# Patient Record
Sex: FEMALE
[De-identification: names, ages, dates, MRNs, and addresses within clinical notes are randomized per-mention and may not be internally consistent; named-entity substitution may affect disease eponyms.]

---

## 2017-07-28 ENCOUNTER — HOSPITAL ENCOUNTER (OUTPATIENT)
Dept: HOSPITAL 42 - SDSVAS | Age: 82
LOS: 1 days | Discharge: HOME | End: 2017-07-29
Attending: INTERNAL MEDICINE
Payer: MEDICARE

## 2017-07-28 VITALS — BODY MASS INDEX: 20.2 KG/M2

## 2017-07-28 DIAGNOSIS — I10: ICD-10-CM

## 2017-07-28 DIAGNOSIS — M19.90: ICD-10-CM

## 2017-07-28 DIAGNOSIS — I50.9: ICD-10-CM

## 2017-07-28 DIAGNOSIS — I25.118: ICD-10-CM

## 2017-07-28 DIAGNOSIS — E78.5: ICD-10-CM

## 2017-07-28 DIAGNOSIS — I73.9: Primary | ICD-10-CM

## 2017-07-28 DIAGNOSIS — M81.0: ICD-10-CM

## 2017-07-28 DIAGNOSIS — J45.909: ICD-10-CM

## 2017-07-28 DIAGNOSIS — Z85.850: ICD-10-CM

## 2017-07-28 DIAGNOSIS — Z85.038: ICD-10-CM

## 2017-07-28 LAB
ADD MANUAL DIFF?: NO
APTT BLD: 30.1 SECONDS (ref 23.7–30.8)
BASOPHILS # BLD AUTO: 0.06 K/MM3 (ref 0–2)
BASOPHILS NFR BLD: 0.9 % (ref 0–3)
BUN SERPL-MCNC: 13 MG/DL (ref 7–21)
CALCIUM SERPL-MCNC: 9.4 MG/DL (ref 8.4–10.5)
CHLORIDE SERPL-SCNC: 103 MMOL/L (ref 95–110)
CHOLEST SERPL-MCNC: 161 MG/DL (ref 130–200)
CO2 SERPL-SCNC: 24 MMOL/L (ref 21–33)
EOSINOPHIL # BLD: 0.9 10*3/UL (ref 0–0.7)
EOSINOPHIL NFR BLD: 14.3 % (ref 1.5–5)
ERYTHROCYTE [DISTWIDTH] IN BLOOD BY AUTOMATED COUNT: 13.9 % (ref 11.5–14.5)
GLUCOSE SERPL-MCNC: 86 MG/DL (ref 70–110)
GRANULOCYTES # BLD: 3.31 10*3/UL (ref 1.4–6.5)
GRANULOCYTES NFR BLD: 50.4 % (ref 50–68)
HCT VFR BLD CALC: 41.2 % (ref 36–48)
INR PPP: 1.08 (ref 0.93–1.08)
LYMPHOCYTES # BLD: 1.8 10*3/UL (ref 1.2–3.4)
LYMPHOCYTES NFR BLD AUTO: 26.6 % (ref 22–35)
MCH RBC QN AUTO: 33.4 PG (ref 25–35)
MCHC RBC AUTO-ENTMCNC: 33.7 G/DL (ref 31–37)
MCV RBC AUTO: 99 FL (ref 80–105)
MONOCYTES # BLD AUTO: 0.5 10*3/UL (ref 0.1–0.6)
MONOCYTES NFR BLD: 7.8 % (ref 1–6)
PLATELET # BLD: 290 10^3/UL (ref 120–450)
PMV BLD AUTO: 11.1 FL (ref 7–11)
POTASSIUM SERPL-SCNC: 4.2 MMOL/L (ref 3.6–5)
SODIUM SERPL-SCNC: 139 MMOL/L (ref 132–148)
WBC # BLD AUTO: 6.6 10^3/UL (ref 4.5–11)

## 2017-07-28 PROCEDURE — 85730 THROMBOPLASTIN TIME PARTIAL: CPT

## 2017-07-28 PROCEDURE — 85025 COMPLETE CBC W/AUTO DIFF WBC: CPT

## 2017-07-28 PROCEDURE — 86900 BLOOD TYPING SEROLOGIC ABO: CPT

## 2017-07-28 PROCEDURE — 36200 PLACE CATHETER IN AORTA: CPT

## 2017-07-28 PROCEDURE — 86850 RBC ANTIBODY SCREEN: CPT

## 2017-07-28 PROCEDURE — 75625 CONTRAST EXAM ABDOMINL AORTA: CPT

## 2017-07-28 PROCEDURE — 85610 PROTHROMBIN TIME: CPT

## 2017-07-28 PROCEDURE — 36221 PLACE CATH THORACIC AORTA: CPT

## 2017-07-28 PROCEDURE — 75716 ARTERY X-RAYS ARMS/LEGS: CPT

## 2017-07-28 PROCEDURE — 80061 LIPID PANEL: CPT

## 2017-07-28 PROCEDURE — 36415 COLL VENOUS BLD VENIPUNCTURE: CPT

## 2017-07-28 PROCEDURE — 99153 MOD SED SAME PHYS/QHP EA: CPT

## 2017-07-28 PROCEDURE — 99152 MOD SED SAME PHYS/QHP 5/>YRS: CPT

## 2017-07-28 PROCEDURE — 80048 BASIC METABOLIC PNL TOTAL CA: CPT

## 2017-07-28 NOTE — CP.PCM.PN
Subjective





- Date & Time of Evaluation


Date of Evaluation: 07/28/17


Time of Evaluation: 18:36





- Subjective


Subjective: 





s/p peripheral angiogram 





Objective





- Vital Signs/Intake and Output


Vital Signs (last 24 hours): 


 











Temp Pulse Resp BP Pulse Ox


 


 98.1 F   76   18   125/58 L  90 L


 


 07/28/17 18:08  07/28/17 18:08  07/28/17 18:08  07/28/17 18:08  07/28/17 13:25











- Medications


Medications: 


 Current Medications





Atorvastatin Calcium (Lipitor)  10 mg PO DIN NORM


Carvedilol (Coreg)  3.125 mg PO BID NORM


Duloxetine HCl (Cymbalta)  60 mg PO DAILY NORM


Furosemide (Lasix)  20 mg PO DAILY NORM


Gabapentin (Neurontin)  100 mg PO DAILY NORM


   PRN Reason: Protocol


Sodium Chloride (Sodium Chloride 0.9%)  1,000 mls @ 100 mls/hr IV .Q10H NORM


Levothyroxine Sodium (Synthroid)  75 mcg PO DAILY NORM


Losartan Potassium (Cozaar)  25 mg PO BID NORM


Non-Formulary Medication (Ranolazine [Ranexa])  500 mg PO BID NORM


Pantoprazole Sodium (Protonix Ec Tab)  40 mg PO ACB NORM


Spironolactone (Aldactone)  25 mg PO BID NORM


Zolpidem Tartrate (Ambien)  5 mg PO HS NORM


   PRN Reason: Protocol











- Labs


Labs: 


 





 07/28/17 13:35 





 07/28/17 13:35 





 











PT  11.7 Seconds (9.9-11.8)   07/28/17  13:35    


 


INR  1.08  (0.93-1.08)   07/28/17  13:35    


 


APTT  30.1 Seconds (23.7-30.8)   07/28/17  13:35    














Assessment and Plan


(1) History of femoral angiogram


Assessment & Plan: 


s/p peripheral angiogram





Diffuse bilateral PVOD 


Rx 


medical therapy 


no intervention for now 





Status: Acute

## 2017-07-29 VITALS — OXYGEN SATURATION: 99 % | TEMPERATURE: 98.1 F | RESPIRATION RATE: 18 BRPM

## 2017-07-29 VITALS — HEART RATE: 64 BPM | DIASTOLIC BLOOD PRESSURE: 65 MMHG | SYSTOLIC BLOOD PRESSURE: 125 MMHG

## 2017-10-09 ENCOUNTER — HOSPITAL ENCOUNTER (OUTPATIENT)
Dept: HOSPITAL 42 - CATH | Age: 82
LOS: 1 days | Discharge: HOME | End: 2017-10-10
Attending: INTERNAL MEDICINE
Payer: MEDICARE

## 2017-10-09 VITALS — BODY MASS INDEX: 20.2 KG/M2

## 2017-10-09 DIAGNOSIS — I10: ICD-10-CM

## 2017-10-09 DIAGNOSIS — I25.2: ICD-10-CM

## 2017-10-09 DIAGNOSIS — E78.5: ICD-10-CM

## 2017-10-09 DIAGNOSIS — Z95.5: ICD-10-CM

## 2017-10-09 DIAGNOSIS — I25.119: Primary | ICD-10-CM

## 2017-10-09 LAB
APTT BLD: 29.7 SECONDS (ref 23.7–30.8)
BASOPHILS # BLD AUTO: 0.05 K/MM3 (ref 0–2)
BASOPHILS NFR BLD: 0.6 % (ref 0–3)
BUN SERPL-MCNC: 16 MG/DL (ref 7–21)
CALCIUM SERPL-MCNC: 9.7 MG/DL (ref 8.4–10.5)
CHLORIDE SERPL-SCNC: 103 MMOL/L (ref 98–107)
CHOLEST SERPL-MCNC: 183 MG/DL (ref 130–200)
CO2 SERPL-SCNC: 27 MMOL/L (ref 21–33)
EOSINOPHIL # BLD: 0.8 10*3/UL (ref 0–0.7)
EOSINOPHIL NFR BLD: 10 % (ref 1.5–5)
ERYTHROCYTE [DISTWIDTH] IN BLOOD BY AUTOMATED COUNT: 13 % (ref 11.5–14.5)
GLUCOSE SERPL-MCNC: 99 MG/DL (ref 70–110)
GRANULOCYTES # BLD: 4.53 10*3/UL (ref 1.4–6.5)
GRANULOCYTES NFR BLD: 58.7 % (ref 50–68)
HCT VFR BLD CALC: 44.3 % (ref 36–48)
INR PPP: 1.06 (ref 0.93–1.08)
LYMPHOCYTES # BLD: 1.7 10*3/UL (ref 1.2–3.4)
LYMPHOCYTES NFR BLD AUTO: 21.8 % (ref 22–35)
MCH RBC QN AUTO: 33.2 PG (ref 25–35)
MCHC RBC AUTO-ENTMCNC: 33.4 G/DL (ref 31–37)
MCV RBC AUTO: 99.3 FL (ref 80–105)
MONOCYTES # BLD AUTO: 0.7 10*3/UL (ref 0.1–0.6)
MONOCYTES NFR BLD: 8.9 % (ref 1–6)
PLATELET # BLD: 287 10^3/UL (ref 120–450)
PMV BLD AUTO: 11 FL (ref 7–11)
POTASSIUM SERPL-SCNC: 4.4 MMOL/L (ref 3.6–5)
SODIUM SERPL-SCNC: 142 MMOL/L (ref 132–148)
WBC # BLD AUTO: 7.7 10^3/UL (ref 4.5–11)

## 2017-10-09 PROCEDURE — 85730 THROMBOPLASTIN TIME PARTIAL: CPT

## 2017-10-09 PROCEDURE — 85610 PROTHROMBIN TIME: CPT

## 2017-10-09 PROCEDURE — 92920 PRQ TRLUML C ANGIOP 1ART&/BR: CPT

## 2017-10-09 PROCEDURE — 86900 BLOOD TYPING SEROLOGIC ABO: CPT

## 2017-10-09 PROCEDURE — 99153 MOD SED SAME PHYS/QHP EA: CPT

## 2017-10-09 PROCEDURE — 93458 L HRT ARTERY/VENTRICLE ANGIO: CPT

## 2017-10-09 PROCEDURE — 99152 MOD SED SAME PHYS/QHP 5/>YRS: CPT

## 2017-10-09 PROCEDURE — 85025 COMPLETE CBC W/AUTO DIFF WBC: CPT

## 2017-10-09 PROCEDURE — 80048 BASIC METABOLIC PNL TOTAL CA: CPT

## 2017-10-09 PROCEDURE — 80061 LIPID PANEL: CPT

## 2017-10-09 PROCEDURE — 86850 RBC ANTIBODY SCREEN: CPT

## 2017-10-09 PROCEDURE — 36415 COLL VENOUS BLD VENIPUNCTURE: CPT

## 2017-10-09 RX ADMIN — MORPHINE SULFATE PRN MG: 2 INJECTION, SOLUTION INTRAMUSCULAR; INTRAVENOUS at 19:42

## 2017-10-09 NOTE — CARDCATH
PROCEDURE DATE:  10/09/2017



INDICATIONS:  The patient is an 85-year-old  female with past

medical history significant for CAD, hypertension, dyslipidemia, status

post PTCA and stenting of left anterior descending artery and laser

atherectomy of left circumflex artery that was done 4 years ago with assist

of Impella, at which time she had presented with a cardiogenic shock and

acute MI.  The patient recently was evaluated with a nuclear stress test

prior to a surgical procedure for preoperative cardiovascular

stratification as she was having symptoms of angina and chest pain

accompanied by shortness of breath for which she was brought to the cardiac

cath lab for further evaluation and treatment.



PROCEDURE PERFORMED:  Left heart catheterization with selective left and

right coronary angiogram, 6-South African left femoral arterial access, PTCA of

left circumflex coronary artery with use of 1.5 and 2.0 balloon, manual

pressure for hemostasis.



TECHNIQUES OF PROCEDURE:  After obtaining informed consent, the patient was

brought to the cardiac cath suite in post-absorptive non-sedated state. 

The patient was prepped and draped in the usual sterile fashion.  A 2%

lidocaine was used for infiltration anesthesia.  Using modified Seldinger

technique, a 6-South African sheath was introduced into the left femoral artery. 

Subsequently, a JL5 and a JR4 diagnostic catheter was used to engage the

left and right coronary systems and angiograms were obtained in 2

orthogonal views.  Subsequently, pigtail catheter was advanced across the

aortic valve and LV gram was obtained in the GIBBS view.  Hemodynamics were

obtained.



HEMODYNAMIC FINDINGS:  Left ventricular end-diastolic pressure was 19 mmHg.

There was no gradient noted upon the aortic valve pullback.  There was no

AI, no MR.  Left ventricular ejection fraction estimated to be 40% to 45%

with mild global hypokinesis.



CORONARY ANATOMY:  Left main is the large-sized vessel bifurcates into left

anterior descending and left circumflex coronary artery.  Left anterior

descending severely calcified, known obstructive with a patent prox to LAD

stent, gives off a medium-sized diagonal branch.  Left circumflex coronary

artery severely calcified with 80% proximal, 70% mid and 80% distal

stenosis.  Right coronary artery mid 100%  with left to right

co-dominant circulation.



TECHNIQUES AND INTERVENTION:  After reviewing the above angiographic

findings, it was decided to further intervene on the severely calcified

left circumflex stenosis over XB 4.0 guiding catheter.  A Prowater wire was

negotiated through the lesions into the distal left circumflex coronary

artery.  Initially, attempt was made to pass the 2.0 balloon which was

unable to pass.  At this point, a 1.5 balloon was inflated to pre-dilate

the calcified lesions.  Subsequently, a 2-0 balloon was used.  The balloon

was unable to cross into the mid lesion.  At this time, it was decided to

abort the procedure for possible atherectomy.



IMPRESSION:  Attempted revascularization of severely calcified left

circumflex coronary artery with use of 1.5 and 2.0 balloon.



RECOMMENDATIONS:  The patient is to be brought back for stage intervention

with atherectomy with a Rotablator which will be arranged at [facility] in 2 to 4 weeks' time.  Keep the patient on guideline-directed

therapy, dual-antiplatelet therapy, beta-blockers, ACE inhibitors, statin,

and nitrates.







__________________________________________

Ciaran Rene MD





DD:  10/09/2017 13:11:57

DT:  10/09/2017 14:51:12

Job # [id number]

## 2017-10-10 VITALS
TEMPERATURE: 98.5 F | HEART RATE: 66 BPM | RESPIRATION RATE: 18 BRPM | SYSTOLIC BLOOD PRESSURE: 141 MMHG | DIASTOLIC BLOOD PRESSURE: 77 MMHG

## 2017-10-10 VITALS — OXYGEN SATURATION: 95 %

## 2017-10-10 RX ADMIN — MORPHINE SULFATE PRN MG: 2 INJECTION, SOLUTION INTRAMUSCULAR; INTRAVENOUS at 08:33

## 2017-12-12 ENCOUNTER — HOSPITAL ENCOUNTER (INPATIENT)
Dept: HOSPITAL 14 - H.ER | Age: 82
LOS: 2 days | Discharge: ANOTHER HOSPITAL | DRG: 293 | End: 2017-12-14
Attending: FAMILY MEDICINE | Admitting: FAMILY MEDICINE
Payer: MEDICARE

## 2017-12-12 VITALS — BODY MASS INDEX: 20.2 KG/M2

## 2017-12-12 VITALS — RESPIRATION RATE: 18 BRPM

## 2017-12-12 DIAGNOSIS — E78.00: ICD-10-CM

## 2017-12-12 DIAGNOSIS — Z86.73: ICD-10-CM

## 2017-12-12 DIAGNOSIS — I50.23: ICD-10-CM

## 2017-12-12 DIAGNOSIS — I25.10: ICD-10-CM

## 2017-12-12 DIAGNOSIS — Z85.850: ICD-10-CM

## 2017-12-12 DIAGNOSIS — R62.52: ICD-10-CM

## 2017-12-12 DIAGNOSIS — E03.9: ICD-10-CM

## 2017-12-12 DIAGNOSIS — M19.90: ICD-10-CM

## 2017-12-12 DIAGNOSIS — J45.909: ICD-10-CM

## 2017-12-12 DIAGNOSIS — M81.0: ICD-10-CM

## 2017-12-12 DIAGNOSIS — E78.5: ICD-10-CM

## 2017-12-12 DIAGNOSIS — F41.9: ICD-10-CM

## 2017-12-12 DIAGNOSIS — I11.0: Primary | ICD-10-CM

## 2017-12-12 DIAGNOSIS — Z88.0: ICD-10-CM

## 2017-12-12 LAB
ALBUMIN/GLOB SERPL: 1.3 {RATIO} (ref 1–2.1)
ALP SERPL-CCNC: 68 U/L (ref 38–126)
ALT SERPL-CCNC: 33 U/L (ref 9–52)
APTT BLD: 34.3 SECONDS (ref 25.6–37.1)
AST SERPL-CCNC: 20 U/L (ref 14–36)
BASE EXCESS BLDV CALC-SCNC: 1 MMOL/L (ref 0–2)
BASOPHILS # BLD AUTO: 0.1 K/UL (ref 0–0.2)
BASOPHILS NFR BLD: 0.8 % (ref 0–2)
BILIRUB SERPL-MCNC: 0.7 MG/DL (ref 0.2–1.3)
BUN SERPL-MCNC: 14 MG/DL (ref 7–17)
CALCIUM SERPL-MCNC: 9.4 MG/DL (ref 8.4–10.2)
CHLORIDE SERPL-SCNC: 102 MMOL/L (ref 98–107)
CO2 SERPL-SCNC: 24 MMOL/L (ref 22–30)
EOSINOPHIL # BLD AUTO: 0.5 K/UL (ref 0–0.7)
EOSINOPHIL NFR BLD: 7.2 % (ref 0–4)
ERYTHROCYTE [DISTWIDTH] IN BLOOD BY AUTOMATED COUNT: 14.2 % (ref 11.5–14.5)
GLOBULIN SER-MCNC: 3.1 GM/DL (ref 2.2–3.9)
GLUCOSE SERPL-MCNC: 105 MG/DL (ref 65–105)
HCT VFR BLD CALC: 43.9 % (ref 34–47)
LYMPHOCYTES # BLD AUTO: 0.8 K/UL (ref 1–4.3)
LYMPHOCYTES NFR BLD AUTO: 10.2 % (ref 20–40)
MAGNESIUM SERPL-MCNC: 1.9 MG/DL (ref 1.6–2.3)
MCH RBC QN AUTO: 32.1 PG (ref 27–31)
MCHC RBC AUTO-ENTMCNC: 32 G/DL (ref 33–37)
MCV RBC AUTO: 100.2 FL (ref 81–99)
MONOCYTES # BLD: 1.2 K/UL (ref 0–0.8)
MONOCYTES NFR BLD: 15.3 % (ref 0–10)
NEUTROPHILS # BLD: 5 K/UL (ref 1.8–7)
NEUTROPHILS NFR BLD AUTO: 66.5 % (ref 50–75)
NRBC BLD AUTO-RTO: 0.1 % (ref 0–0)
PCO2 BLDV: 42 MMHG (ref 40–60)
PH BLDV: 7.4 [PH] (ref 7.32–7.43)
PHOSPHATE SERPL-MCNC: 3.9 MG/DL (ref 2.5–4.5)
PLATELET # BLD: 187 K/UL (ref 130–400)
PMV BLD AUTO: 10.5 FL (ref 7.2–11.7)
POTASSIUM SERPL-SCNC: 4.6 MMOL/L (ref 3.6–5)
PROT SERPL-MCNC: 7.1 G/DL (ref 6.3–8.2)
SODIUM SERPL-SCNC: 135 MMOL/L (ref 132–148)
WBC # BLD AUTO: 7.6 K/UL (ref 4.8–10.8)

## 2017-12-12 NOTE — RAD
HISTORY:

Sepsis Patient  



COMPARISON:

07/19/2016 



FINDINGS:



LUNGS:

No active pulmonary disease.



PLEURA:

No significant pleural effusion identified, no pneumothorax apparent.



CARDIOVASCULAR:

Normal.



OSSEOUS STRUCTURES:

No significant abnormalities.



VISUALIZED UPPER ABDOMEN:

Normal.



OTHER FINDINGS:

None.



IMPRESSION:

No active disease. No significant interval change compared to the 

prior examination(s).

## 2017-12-12 NOTE — CARD
--------------- APPROVED REPORT --------------





EKG Measurement

Heart [id number]

WV [id number]

[id number]

[id number]

[id number]



<Conclusion>

Normal sinus rhythm

Left axis deviation

Inferior infarct, age undetermined

Anteroseptal infarct, age undetermined

Abnormal ECG

## 2017-12-13 LAB
ALBUMIN/GLOB SERPL: 1.1 {RATIO} (ref 1–2.1)
ALP SERPL-CCNC: 58 U/L (ref 38–126)
ALT SERPL-CCNC: 29 U/L (ref 9–52)
AST SERPL-CCNC: 15 U/L (ref 14–36)
BASOPHILS # BLD AUTO: 0 K/UL (ref 0–0.2)
BASOPHILS NFR BLD: 0.3 % (ref 0–2)
BILIRUB SERPL-MCNC: 0.4 MG/DL (ref 0.2–1.3)
BUN SERPL-MCNC: 25 MG/DL (ref 7–17)
CALCIUM SERPL-MCNC: 8.9 MG/DL (ref 8.4–10.2)
CHLORIDE SERPL-SCNC: 104 MMOL/L (ref 98–107)
CHOLEST SERPL-MCNC: 152 MG/DL (ref 0–199)
CO2 SERPL-SCNC: 24 MMOL/L (ref 22–30)
EOSINOPHIL # BLD AUTO: 0 K/UL (ref 0–0.7)
EOSINOPHIL NFR BLD: 0 % (ref 0–4)
ERYTHROCYTE [DISTWIDTH] IN BLOOD BY AUTOMATED COUNT: 13.9 % (ref 11.5–14.5)
GLOBULIN SER-MCNC: 3.3 GM/DL (ref 2.2–3.9)
GLUCOSE SERPL-MCNC: 152 MG/DL (ref 65–105)
HCT VFR BLD CALC: 41.8 % (ref 34–47)
LYMPHOCYTES # BLD AUTO: 0.8 K/UL (ref 1–4.3)
LYMPHOCYTES NFR BLD AUTO: 12.9 % (ref 20–40)
MCH RBC QN AUTO: 32.6 PG (ref 27–31)
MCHC RBC AUTO-ENTMCNC: 33.2 G/DL (ref 33–37)
MCV RBC AUTO: 98.3 FL (ref 81–99)
MONOCYTES # BLD: 0.5 K/UL (ref 0–0.8)
MONOCYTES NFR BLD: 8.1 % (ref 0–10)
NEUTROPHILS # BLD: 4.7 K/UL (ref 1.8–7)
NEUTROPHILS NFR BLD AUTO: 78.7 % (ref 50–75)
NRBC BLD AUTO-RTO: 0.1 % (ref 0–0)
PLATELET # BLD: 211 K/UL (ref 130–400)
PMV BLD AUTO: 11.1 FL (ref 7.2–11.7)
POTASSIUM SERPL-SCNC: 4 MMOL/L (ref 3.6–5)
PROT SERPL-MCNC: 7 G/DL (ref 6.3–8.2)
SODIUM SERPL-SCNC: 139 MMOL/L (ref 132–148)
TSH SERPL-ACNC: 0.76 MIU/ML (ref 0.46–4.68)
WBC # BLD AUTO: 6 K/UL (ref 4.8–10.8)

## 2017-12-13 RX ADMIN — BUDESONIDE SCH MG: 0.5 SUSPENSION RESPIRATORY (INHALATION) at 07:40

## 2017-12-13 RX ADMIN — BUDESONIDE SCH MG: 0.5 SUSPENSION RESPIRATORY (INHALATION) at 19:20

## 2017-12-13 NOTE — CARD
--------------- APPROVED REPORT --------------





EXAM: Two-dimensional and M-mode echocardiogram with Doppler and 

color Doppler.



Other Information 

Quality : GoodRhythm : NSR



INDICATION

Chest Pain Congestive Heart Failure 



2D DIMENSIONS 

IVSd0.59   (0.7-1.1cm)LVDd5.67   (3.9-5.9cm)

LVOT Diameter1.73   (1.8-2.4cm)PWd0.45   (0.7-1.1cm)

IVSs0.73   (0.8-1.2cm)LVDs5.01   (2.5-4.0cm)

FS (%) 11.8   %PWs0.68   (0.8-1.2cm)



M-Mode DIMENSIONS 

Left Atrium (MM)3.44   (2.5-4.0cm)IVSd0.71   (0.7-1.1cm)

Aortic Root3.50   (2.2-3.7cm)LVDd5.94   (4.0-5.6cm)

Aortic Cusp Exc.1.76   (1.5-2.0cm)PWd0.62   (0.7-1.1cm)

IVSs0.82   cmFS (%) 15   %

LVDs5.06   (2.0-3.8cm)PWs0.71   cm



Mitral Valve

MV E Gncycbis61.4cm/sMV DECEL [id number] A Jphsercg412.9cm/s

MV TPJ50fmR/A ratio0.4MVA (PHT)3.10cm2



TDI

Lateral E' Peak V7.90cm/sMedial E' Peak V3.91cm/sE/Lateral E'5.0

E/Medial E'10.1



 LEFT VENTRICLE 

The Left Ventricle is borderline dilated.

There is normal left ventricular wall thickness.

Left ventricle systolic function is severely impaired.

The Ejection Fraction is 30-35%.

Severvseptal and Apical hypokinesis

Transmitral Doppler flow pattern is Grade I-abnormal relaxation 

pattern.

The left ventricular apex is not well visualized.



 RIGHT VENTRICLE 

The right ventricle is normal size.

There is normal right ventricular wall thickness.

The right ventricular systolic function is normal.



 ATRIA 

The left atrium size is normal.

The right atrium size is normal.



 AORTIC VALVE 

The aortic valve is mildly thickened.

There is mild to moderate aortic regurgitation.

There is no aortic valvular stenosis. 



 MITRAL VALVE 

The mitral valve is mildly thickened.

There is no mitral valve stenosis.

There is no mitral valve regurgitation noted.



 TRICUSPID VALVE 

The tricuspid valve is normal in structure.

There is no tricuspid valve regurgitation noted.



 PULMONIC VALVE 

The pulmonary valve is normal in structure.

There is no pulmonic valvular regurgitation. 



 GREAT VESSELS 

The aortic root is normal in size.

The IVC was not visualized.



 PERICARDIAL EFFUSION 

The pericardium appears normal.



<Conclusion>

The Left Ventricle is borderline dilated.

There is normal left ventricular wall thickness.

Left ventricle systolic function is severely impaired.

The Ejection Fraction is 30-35%.

Severvseptal and Apical hypokinesis

Transmitral Doppler flow pattern is Grade I-abnormal relaxation 

pattern.

There is mild to moderate aortic regurgitation.

## 2017-12-13 NOTE — CP.PCM.HP
History of Present Illness





- History of Present Illness


History of Present Illness: 





pt admitted for cp, dyspnea. found to have slightly elevated bnp. pt has long 

standing h/o cad, chf.  no f/c, n/v/d. no cp, dyspnea t present. rec'd lasix iv 

in er. cardio consult pending. trops x 3 negative. 





Present on Admission





- Present on Admission


Any Indicators Present on Admission: Yes


History of Uncontrolled Diabetes: Yes





Review of Systems





- Cardiovascular


Cardiovascular: As Per HPI, Chest Pain with Activity, Dyspnea on Exertion





Past Patient History





- Past Medical History & Family History


Past Medical History?: Yes





- Past Social History


Smoking Status: Never Smoked





- CARDIAC


Hx Congestive Heart Failure: Yes


Hx Hypercholesterolemia: Yes


Hx Hypertension: Yes


Hx Pacemaker: No





- PULMONARY


Hx Asthma: Yes


Hx Bronchitis: Yes


Hx Chronic Obstructive Pulmonary Disease (COPD): No





- NEUROLOGICAL


Hx Paralysis: No





- HEENT


Hx HEENT Problems: Yes


Hx Cataracts: Yes





- RENAL


Hx Chronic Kidney Disease: No





- ENDOCRINE/METABOLIC


Hx Hypothyroidism: Yes


Other/Comment: Thyroid Cancer





- HEMATOLOGICAL/ONCOLOGICAL


Hx AIDS: No


Hx Human Immunodeficiency Virus (HIV): No





- INTEGUMENTARY


Hx Dermatological Problems: No





- MUSCULOSKELETAL/RHEUMATOLOGICAL


Hx Arthritis: Yes


Hx Falls: Yes


Hx Osteoporosis: Yes


Hx Rheumatoid Arthritis: No





- GASTROINTESTINAL


Hx Gastrointestinal Disorders: Yes


Hx Bowel Surgery: Yes (Colon Sx due to Colon Ca)





- GENITOURINARY/GYNECOLOGICAL


Hx Genitourinary Disorders: No





- PSYCHIATRIC


Hx Anxiety: Yes


Hx Substance Use: No





- SURGICAL HISTORY


Hx Appendectomy: Yes


Hx Cataract Extraction: Yes (with Bilateral IOL)


Hx  Section: Yes (x1)


Other/Comment: Thyroidectomy due to cancer, Colon Sx due to cancer





- ANESTHESIA


Hx Anesthesia: Yes


Hx Anesthesia Reactions: No


Hx Malignant Hyperthermia: No





Meds


Allergies/Adverse Reactions: 


 Allergies











Allergy/AdvReac Type Severity Reaction Status Date / Time


 


Penicillins Allergy Severe RASH Verified 17 10:59














Physical Exam





- Constitutional


Appears: Well, Non-toxic, No Acute Distress





- Head Exam


Head Exam: ATRAUMATIC, NORMAL INSPECTION, NORMOCEPHALIC





- Eye Exam


Eye Exam: EOMI, Normal appearance, PERRL


Pupil Exam: NORMAL ACCOMODATION, PERRL





- ENT Exam


ENT Exam: Mucous Membranes Moist, Normal Exam





- Neck Exam


Neck exam: Positive for: Normal Inspection





- Respiratory Exam


Respiratory Exam: Clear to Auscultation Bilateral, NORMAL BREATHING PATTERN





- Cardiovascular Exam


Cardiovascular Exam: REGULAR RHYTHM, RRR, +S1, +S2





- GI/Abdominal Exam


GI & Abdominal Exam: Normal Bowel Sounds, Soft.  absent: Tenderness





- Extremities Exam


Extremities exam: Positive for: full ROM, normal capillary refill, normal 

inspection, pedal pulses present





- Back Exam


Back exam: FULL ROM, NORMAL INSPECTION





- Neurological Exam


Neurological exam: Alert, CN II-XII Intact, Normal Gait, Oriented x3, Reflexes 

Normal





- Psychiatric Exam


Psychiatric exam: Normal Affect, Normal Mood





- Skin


Skin Exam: Dry, Intact, Normal Color, Warm





Results





- Vital Signs


Recent Vital Signs: 





 Last Vital Signs











Temp  97.8 F   17 05:00


 


Pulse  79   17 07:40


 


Resp  18   17 05:00


 


BP  104/67   17 06:29


 


Pulse Ox  94 L  17 05:00














- Labs


Result Diagrams: 


 17 04:30





 17 04:30


Labs: 





 Laboratory Results - last 24 hr











  17





  13:30 13:35 13:35


 


WBC    7.6


 


RBC    4.38


 


Hgb    14.0


 


Hct    43.9


 


MCV    100.2 H


 


MCH    32.1 H


 


MCHC    32.0 L


 


RDW    14.2


 


Plt Count    187


 


MPV    10.5


 


Neut % (Auto)    66.5


 


Lymph % (Auto)    10.2 L


 


Mono % (Auto)    15.3 H


 


Eos % (Auto)    7.2 H


 


Baso % (Auto)    0.8


 


Neut #    5.0


 


Lymph #    0.8 L


 


Mono #    1.2 H


 


Eos #    0.5


 


Baso #    0.1


 


PT   


 


INR   


 


APTT   


 


pO2  29 L  


 


VBG pH  7.40  


 


VBG pCO2  42  


 


VBG HCO3  24.7  


 


VBG Total CO2  27.3  


 


VBG O2 Sat (Calc)  66.1 H  


 


VBG Base Excess  1.0  


 


VBG Potassium  4.7  


 


Sodium  133.0  135 


 


Chloride  101.0  102 


 


Glucose  114 H  


 


Lactate  1.5  


 


FiO2  21.0  


 


Potassium   4.6 


 


Carbon Dioxide   24 


 


Anion Gap   14 


 


BUN   14 


 


Creatinine   0.6 L 


 


Est GFR ( Amer)   > 60 


 


Est GFR (Non-Af Amer)   > 60 


 


Random Glucose   105 


 


Calcium   9.4 


 


Phosphorus   3.9 


 


Magnesium   1.9 


 


Total Bilirubin   0.7 


 


AST   20 


 


ALT   33 


 


Alkaline Phosphatase   68 


 


Troponin I   < 0.0120 


 


NT-Pro-B Natriuret Pep   1370 H 


 


Total Protein   7.1 


 


Albumin   4.1 


 


Globulin   3.1 


 


Albumin/Globulin Ratio   1.3 


 


Triglycerides   


 


Cholesterol   


 


LDL Cholesterol Direct   


 


HDL Cholesterol   


 


TSH 3rd Generation   


 


Venous Blood Potassium  4.7  














  17





  13:35 21:28 04:30


 


WBC    6.0


 


RBC    4.25


 


Hgb    13.9


 


Hct    41.8


 


MCV    98.3


 


MCH    32.6 H


 


MCHC    33.2


 


RDW    13.9


 


Plt Count    211


 


MPV    11.1


 


Neut % (Auto)    78.7 H


 


Lymph % (Auto)    12.9 L


 


Mono % (Auto)    8.1


 


Eos % (Auto)    0.0


 


Baso % (Auto)    0.3


 


Neut #    4.7


 


Lymph #    0.8 L


 


Mono #    0.5


 


Eos #    0.0


 


Baso #    0.0


 


PT  13.0  


 


INR  1.2  


 


APTT  34.3  


 


pO2   


 


VBG pH   


 


VBG pCO2   


 


VBG HCO3   


 


VBG Total CO2   


 


VBG O2 Sat (Calc)   


 


VBG Base Excess   


 


VBG Potassium   


 


Sodium   


 


Chloride   


 


Glucose   


 


Lactate   


 


FiO2   


 


Potassium   


 


Carbon Dioxide   


 


Anion Gap   


 


BUN   


 


Creatinine   


 


Est GFR ( Amer)   


 


Est GFR (Non-Af Amer)   


 


Random Glucose   


 


Calcium   


 


Phosphorus   


 


Magnesium   


 


Total Bilirubin   


 


AST   


 


ALT   


 


Alkaline Phosphatase   


 


Troponin I   < 0.0120 


 


NT-Pro-B Natriuret Pep   


 


Total Protein   


 


Albumin   


 


Globulin   


 


Albumin/Globulin Ratio   


 


Triglycerides   


 


Cholesterol   


 


LDL Cholesterol Direct   


 


HDL Cholesterol   


 


TSH 3rd Generation   


 


Venous Blood Potassium   














  17





  04:30


 


WBC 


 


RBC 


 


Hgb 


 


Hct 


 


MCV 


 


MCH 


 


MCHC 


 


RDW 


 


Plt Count 


 


MPV 


 


Neut % (Auto) 


 


Lymph % (Auto) 


 


Mono % (Auto) 


 


Eos % (Auto) 


 


Baso % (Auto) 


 


Neut # 


 


Lymph # 


 


Mono # 


 


Eos # 


 


Baso # 


 


PT 


 


INR 


 


APTT 


 


pO2 


 


VBG pH 


 


VBG pCO2 


 


VBG HCO3 


 


VBG Total CO2 


 


VBG O2 Sat (Calc) 


 


VBG Base Excess 


 


VBG Potassium 


 


Sodium  139


 


Chloride  104


 


Glucose 


 


Lactate 


 


FiO2 


 


Potassium  4.0


 


Carbon Dioxide  24


 


Anion Gap  15


 


BUN  25 H


 


Creatinine  0.7


 


Est GFR ( Amer)  > 60


 


Est GFR (Non-Af Amer)  > 60


 


Random Glucose  152 H


 


Calcium  8.9


 


Phosphorus 


 


Magnesium 


 


Total Bilirubin  0.4


 


AST  15


 


ALT  29


 


Alkaline Phosphatase  58


 


Troponin I  < 0.0120


 


NT-Pro-B Natriuret Pep  1940 H


 


Total Protein  7.0


 


Albumin  3.7


 


Globulin  3.3


 


Albumin/Globulin Ratio  1.1


 


Triglycerides  48


 


Cholesterol  152


 


LDL Cholesterol Direct  79


 


HDL Cholesterol  47


 


TSH 3rd Generation  0.76


 


Venous Blood Potassium 














Assessment & Plan


(1) CHF exacerbation


Assessment and Plan: 


cardio


lasix iv in er, will now give po as bun elevating





Status: Acute   





(2) Chest pain


Assessment and Plan: 


trops negative


cardio


tele obs





Status: Acute   





(3) DVT prophylaxis


Assessment and Plan: 


scd and ae hose


lovenox if admitted over 24h


Status: Acute   Priority: Low   





(4) CAD (coronary artery disease)


Assessment and Plan: 


cardio


cont home meds


Status: Chronic   Priority: Medium   





Decision To Admit





- Pt Status Changed To:


Hospital Disposition Of: Observation





- .


Bed Request Type: Telemetry


Admitting Physician: Marco Antonio Jensen

## 2017-12-14 VITALS
OXYGEN SATURATION: 97 % | HEART RATE: 86 BPM | SYSTOLIC BLOOD PRESSURE: 136 MMHG | DIASTOLIC BLOOD PRESSURE: 80 MMHG | TEMPERATURE: 98.3 F

## 2017-12-14 LAB
ALBUMIN/GLOB SERPL: 1.2 {RATIO} (ref 1–2.1)
ALP SERPL-CCNC: 62 U/L (ref 38–126)
ALT SERPL-CCNC: 24 U/L (ref 9–52)
APTT BLD: 29.4 SECONDS (ref 25.6–37.1)
AST SERPL-CCNC: 21 U/L (ref 14–36)
BASOPHILS # BLD AUTO: 0.1 K/UL (ref 0–0.2)
BASOPHILS NFR BLD: 0.7 % (ref 0–2)
BILIRUB SERPL-MCNC: 0.4 MG/DL (ref 0.2–1.3)
BUN SERPL-MCNC: 32 MG/DL (ref 7–17)
CALCIUM SERPL-MCNC: 8.8 MG/DL (ref 8.4–10.2)
CHLORIDE SERPL-SCNC: 103 MMOL/L (ref 98–107)
CO2 SERPL-SCNC: 25 MMOL/L (ref 22–30)
EOSINOPHIL # BLD AUTO: 0.1 K/UL (ref 0–0.7)
EOSINOPHIL NFR BLD: 1.6 % (ref 0–4)
ERYTHROCYTE [DISTWIDTH] IN BLOOD BY AUTOMATED COUNT: 14.1 % (ref 11.5–14.5)
GLOBULIN SER-MCNC: 3.1 GM/DL (ref 2.2–3.9)
GLUCOSE SERPL-MCNC: 93 MG/DL (ref 65–105)
HCT VFR BLD CALC: 42.9 % (ref 34–47)
LYMPHOCYTES # BLD AUTO: 1.5 K/UL (ref 1–4.3)
LYMPHOCYTES NFR BLD AUTO: 19.1 % (ref 20–40)
MCH RBC QN AUTO: 32.2 PG (ref 27–31)
MCHC RBC AUTO-ENTMCNC: 32.8 G/DL (ref 33–37)
MCV RBC AUTO: 98.2 FL (ref 81–99)
MONOCYTES # BLD: 0.9 K/UL (ref 0–0.8)
MONOCYTES NFR BLD: 11.4 % (ref 0–10)
NEUTROPHILS # BLD: 5.1 K/UL (ref 1.8–7)
NEUTROPHILS NFR BLD AUTO: 67.2 % (ref 50–75)
NRBC BLD AUTO-RTO: 0 % (ref 0–0)
PLATELET # BLD: 227 K/UL (ref 130–400)
PMV BLD AUTO: 10.9 FL (ref 7.2–11.7)
POTASSIUM SERPL-SCNC: 4.5 MMOL/L (ref 3.6–5)
PROT SERPL-MCNC: 6.9 G/DL (ref 6.3–8.2)
SODIUM SERPL-SCNC: 140 MMOL/L (ref 132–148)
WBC # BLD AUTO: 7.6 K/UL (ref 4.8–10.8)

## 2017-12-14 RX ADMIN — BUDESONIDE SCH MG: 0.5 SUSPENSION RESPIRATORY (INHALATION) at 08:00

## 2017-12-14 NOTE — PQF GENQUE
HARMONY Kapoor NP,



Please specify the type of heart failure in your progress notes:

 TYPE:

 Combined systolic and diastolic

 Diastolic

 Systolic

 Other (please specify)______________

 Clinically unable to determine

 Unknown





H and P: --CHF exacerbation  Assessment : cardio,  lasix iv in er, will now 
give po as bun elevating  Status: Acute  (2) Chest pain  Assessment and Plan:   
trops negative  cardio  tele obs  Status: Acute  

--CAD (coronary artery disease)  Assessment and Plan:   cardio  cont home meds  
Status: Chronic Priority: Medium  



ProBNP: 1370->1940



Echo:  The Left Ventricle is borderline dilated. There is normal left 
ventricular wall thickness. Left ventricle systolic function is severely 
impaired. The Ejection Fraction is 30-35%. Severvseptal and Apical hypokinesis  
Transmitral Doppler flow pattern is Grade I-abnormal relaxation  pattern. There 
is mild to moderate aortic regurgitation



Lasix IV->Oral, Coreg











***** This form is a permanent part of the medical record*****





          

Clarification of your documentation is requested to better reflect the severity 
of illness and intensity of treatment of your patient.  



Indicators present      



[]  Specify: []

         



[]  Specify: []         

 



[]  Specify: []         





[]  Specify: []         





Location in the medical record that reflects the above clinical findings:  []

 



Treatment Provided:  []

  

PHYSICIAN'S RESPONSE





Based on your medical judgment of the clinical indicators outlined above please 
clarify the following:



[]  Practitioner response chronic systolic



[]  If unable to determine, please check the box, sign and date.  





Present On Admission (POA) Indicator:





[x] Present at the time of admission 



[] Not present at the time of admission



[] Clinically Undetermined









In responding to this query, please exercise your independent professional 
judgment.  The fact that a question is asked does not imply that any particular 
answer is desired or expected.  Thank you for your clarification on this 
documentation.



If you have any questions please call.

* Thank you,

   Bella Whiting RN

   ext. #[phone] 
MT[facility]

## 2017-12-14 NOTE — CP.PCM.CON
History of Present Illness





- History of Present Illness


History of Present Illness: 





Consultation for evaluation of chest pain and CHF 





HPI: 








Past Patient History





- Past Medical History & Family History


Past Medical History?: Yes





- Past Social History


Smoking Status: Never Smoked





- CARDIAC


Hx Congestive Heart Failure: Yes


Hx Hypercholesterolemia: Yes


Hx Hypertension: Yes


Hx Pacemaker: No





- PULMONARY


Hx Asthma: Yes


Hx Bronchitis: Yes


Hx Chronic Obstructive Pulmonary Disease (COPD): No





- NEUROLOGICAL


Hx Paralysis: No





- HEENT


Hx HEENT Problems: Yes


Hx Cataracts: Yes





- RENAL


Hx Chronic Kidney Disease: No





- ENDOCRINE/METABOLIC


Hx Hypothyroidism: Yes


Other/Comment: Thyroid Cancer





- HEMATOLOGICAL/ONCOLOGICAL


Hx AIDS: No


Hx Human Immunodeficiency Virus (HIV): No





- INTEGUMENTARY


Hx Dermatological Problems: No





- MUSCULOSKELETAL/RHEUMATOLOGICAL


Hx Arthritis: Yes


Hx Falls: Yes


Hx Osteoporosis: Yes


Hx Rheumatoid Arthritis: No





- GASTROINTESTINAL


Hx Gastrointestinal Disorders: Yes


Hx Bowel Surgery: Yes (Colon Sx due to Colon Ca)





- GENITOURINARY/GYNECOLOGICAL


Hx Genitourinary Disorders: No





- PSYCHIATRIC


Hx Anxiety: Yes


Hx Substance Use: No





- SURGICAL HISTORY


Hx Appendectomy: Yes


Hx Cataract Extraction: Yes (with Bilateral IOL)


Hx  Section: Yes (x1)


Other/Comment: Thyroidectomy due to cancer, Colon Sx due to cancer





- ANESTHESIA


Hx Anesthesia: Yes


Hx Anesthesia Reactions: No


Hx Malignant Hyperthermia: No





Meds


Allergies/Adverse Reactions: 


 Allergies











Allergy/AdvReac Type Severity Reaction Status Date / Time


 


Penicillins Allergy Severe RASH Verified 17 10:59














- Medications


Medications: 


 Current Medications





Aspirin (Ecotrin)  81 mg PO DAILY [facility]


   Last Admin: 17 08:51 Dose:  81 mg


Atorvastatin Calcium (Lipitor)  10 mg PO HS [facility]


   Last Admin: 17 21:35 Dose:  Not Given


Budesonide (Pulmicort Respules)  0.5 mg IH Q12H [facility]


   Last Admin: 17 19:20 Dose:  0.5 mg


Carvedilol (Coreg)  3.125 mg PO Q12H [facility]


   Last Admin: 17 19:01 Dose:  3.125 mg


Duloxetine HCl (Cymbalta)  60 mg PO DAILY [facility]


   Last Admin: 17 08:49 Dose:  60 mg


Furosemide (Lasix)  40 mg PO DAILY [facility]


   Last Admin: 17 08:50 Dose:  40 mg


Gabapentin (Neurontin)  100 mg PO TID [facility]


   Last Admin: 17 17:00 Dose:  100 mg


Home Med (Ranolazine [Ranexa])  500 mg PO Q12H [facility]


Home Med (Salsalate [Disalcid])  1,000 mg PO BID [facility]


Home Med (Vorapaxar Sulfate [Zontivity])  2.08 mg PO DAILY [facility]


Levothyroxine Sodium (Synthroid)  75 mcg PO DAILY@0630 [facility]


   Last Admin: 17 06:29 Dose:  75 mcg


Pantoprazole Sodium (Protonix Ec Tab)  20 mg PO DAILY [facility]


   Last Admin: 17 08:49 Dose:  20 mg


Spironolactone (Aldactone)  25 mg PO BID [facility]


   Last Admin: 17 17:00 Dose:  25 mg


Valsartan (Diovan)  40 mg PO DAILY [facility]


   Last Admin: 17 08:50 Dose:  40 mg


Zolpidem Tartrate (Ambien)  2.5 mg PO HS PRN


   PRN Reason: Insomnia


   Last Admin: 17 21:35 Dose:  2.5 mg











Physical Exam





- Constitutional


Appears: Well





- Head Exam


Head Exam: ATRAUMATIC, NORMAL INSPECTION, NORMOCEPHALIC





- Eye Exam


Eye Exam: EOMI, Normal appearance, PERRL


Pupil Exam: NORMAL ACCOMODATION, PERRL





- ENT Exam


ENT Exam: Mucous Membranes Moist, Normal Exam





- Neck Exam


Neck exam: Positive for: Normal Inspection





- Respiratory Exam


Respiratory Exam: Clear to Auscultation Bilateral, NORMAL BREATHING PATTERN





- Cardiovascular Exam


Cardiovascular Exam: REGULAR RHYTHM





- GI/Abdominal Exam


GI & Abdominal Exam: Normal Bowel Sounds, Soft.  absent: Tenderness





- Rectal Exam


Rectal Exam: NORMAL INSPECTION





-  Exam


 Exam: Circumcision, NORMAL INSPECTION


External exam: NORMAL EXTERNAL EXAM


Speculum exam: NORMAL SPECULUM EXAM


Bimanual exam: NORMAL BIMANUAL EXAM





- Extremities Exam


Extremities exam: Positive for: normal inspection





- Back Exam


Back exam: NORMAL INSPECTION





- Neurological Exam


Neurological exam: Alert, CN II-XII Intact, Normal Gait, Oriented x3, Reflexes 

Normal





- Psychiatric Exam


Psychiatric exam: Normal Affect, Normal Mood





- Skin


Skin Exam: Dry, Intact, Normal Color, Warm





Results





- Vital Signs


Recent Vital Signs: 


 Last Vital Signs











Temp  98.2 F   17 00:08


 


Pulse  80   17 00:08


 


Resp  18   12/14/17 00:08


 


BP  120/70   17 00:08


 


Pulse Ox  94 L  17 00:08














- Labs


Result Diagrams: 


 17 04:30





 17 04:30


Labs: 


 Laboratory Results - last 24 hr











  17





  04:30 04:30 04:30


 


WBC  6.0  


 


RBC  4.25  


 


Hgb  13.9  


 


Hct  41.8  


 


MCV  98.3  


 


MCH  32.6 H  


 


MCHC  33.2  


 


RDW  13.9  


 


Plt Count  211  


 


MPV  11.1  


 


Neut % (Auto)  78.7 H  


 


Lymph % (Auto)  12.9 L  


 


Mono % (Auto)  8.1  


 


Eos % (Auto)  0.0  


 


Baso % (Auto)  0.3  


 


Neut #  4.7  


 


Lymph #  0.8 L  


 


Mono #  0.5  


 


Eos #  0.0  


 


Baso #  0.0  


 


Sodium   139 


 


Potassium   4.0 


 


Chloride   104 


 


Carbon Dioxide   24 


 


Anion Gap   15 


 


BUN   25 H 


 


Creatinine   0.7 


 


Est GFR ( Amer)   > 60 


 


Est GFR (Non-Af Amer)   > 60 


 


Random Glucose   152 H 


 


Hemoglobin A1c    5.9


 


Calcium   8.9 


 


Total Bilirubin   0.4 


 


AST   15 


 


ALT   29 


 


Alkaline Phosphatase   58 


 


Troponin I   < 0.0120 


 


NT-Pro-B Natriuret Pep   1940 H 


 


Total Protein   7.0 


 


Albumin   3.7 


 


Globulin   3.3 


 


Albumin/Globulin Ratio   1.1 


 


Triglycerides   48 


 


Cholesterol   152 


 


LDL Cholesterol Direct   79 


 


HDL Cholesterol   47 


 


TSH 3rd Generation   0.76 














Assessment & Plan


(1) CHF exacerbation


Status: Acute   





(2) Chest pain


Status: Acute   





(3) Dyspnea


Status: Acute   





(4) CAD (coronary artery disease)


Status: Chronic   Priority: Medium   





(5) Dyslipidemia


Status: Chronic   Priority: Low   





(6) HTN (hypertension)


Status: Chronic

## 2017-12-14 NOTE — PQF GENQUE
EVAN Kapoor NP,



2 queries:

In agreement with BMI: 19.4: if yes please document the BMI and a correleating 
dx.: i.e. Underweight, Small framed etc. 



OR: Disagree

OR: Unable to determine 



EMR lists: 5ft  99lb











***** This form is a permanent part of the medical record*****





          

Clarification of your documentation is requested to better reflect the severity 
of illness and intensity of treatment of your patient.  



Indicators present      



[]  Specify: []

         



[]  Specify: []         

 



[]  Specify: []         





[]  Specify: []         





Location in the medical record that reflects the above clinical findings:  []

 



Treatment Provided:  []

  

PHYSICIAN'S RESPONSE





Based on your medical judgment of the clinical indicators outlined above please 
clarify the following:



[]  Practitioner response small stature



[]  If unable to determine, please check the box, sign and date.  





Present On Admission (POA) Indicator:





[x] Present at the time of admission 



[] Not present at the time of admission



[] Clinically Undetermined









In responding to this query, please exercise your independent professional 
judgment.  The fact that a question is asked does not imply that any particular 
answer is desired or expected.  Thank you for your clarification on this 
documentation.



If you have any questions please call.

* Thank you,

   Bella Whiting RN 

   ext. #[phone] 
MTDD

## 2017-12-14 NOTE — CP.PCM.DIS
Provider





- Provider


Date of Admission: 


12/12/17 19:21





Attending physician: 


Marco Antonio Jensen MD





Time Spent in preparation of Discharge (in minutes): 15





Diagnosis





- Discharge Diagnosis


(1) CHF exacerbation


Status: Acute   





(2) Chest pain


Status: Acute   





(3) DVT prophylaxis


Status: Acute   Priority: Low   





(4) CAD (coronary artery disease)


Status: Chronic   Priority: Medium   





Hospital Course





- Lab Results


Lab Results: 


 Micro Results





12/12/17 13:30   Blood   Blood Culture - Preliminary


                            NO GROWTH AFTER 24 HOURS


12/12/17 13:00   Blood   Blood Culture - Preliminary


                            NO GROWTH AFTER 24 HOURS





 Most Recent Lab Values











WBC  7.6 K/uL (4.8-10.8)   12/14/17  04:25    


 


RBC  4.37 Mil/uL (3.80-5.20)   12/14/17  04:25    


 


Hgb  14.1 g/dL (12.0-16.0)   12/14/17  04:25    


 


Hct  42.9 % (34.0-47.0)   12/14/17  04:25    


 


MCV  98.2 fl (81.0-99.0)   12/14/17  04:25    


 


MCH  32.2 pg (27.0-31.0)  H  12/14/17  04:25    


 


MCHC  32.8 g/dL (33.0-37.0)  L  12/14/17  04:25    


 


RDW  14.1 % (11.5-14.5)   12/14/17  04:25    


 


Plt Count  227 K/uL (130-400)   12/14/17  04:25    


 


MPV  10.9 fl (7.2-11.7)   12/14/17  04:25    


 


Neut % (Auto)  67.2 % (50.0-75.0)   12/14/17  04:25    


 


Lymph % (Auto)  19.1 % (20.0-40.0)  L  12/14/17  04:25    


 


Mono % (Auto)  11.4 % (0.0-10.0)  H  12/14/17  04:25    


 


Eos % (Auto)  1.6 % (0.0-4.0)   12/14/17  04:25    


 


Baso % (Auto)  0.7 % (0.0-2.0)   12/14/17  04:25    


 


Neut #  5.1 K/uL (1.8-7.0)   12/14/17  04:25    


 


Lymph #  1.5 K/uL (1.0-4.3)   12/14/17  04:25    


 


Mono #  0.9 K/uL (0.0-0.8)  H  12/14/17  04:25    


 


Eos #  0.1 K/uL (0.0-0.7)   12/14/17  04:25    


 


Baso #  0.1 K/uL (0.0-0.2)   12/14/17  04:25    


 


PT  11.6 Seconds (9.8-13.1)   12/14/17  04:25    


 


INR  1.0  (0.9-1.2)   12/14/17  04:25    


 


APTT  29.4 Seconds (25.6-37.1)   12/14/17  04:25    


 


pO2  29 mm/Hg (30-55)  L  12/12/17  13:30    


 


VBG pH  7.40  (7.32-7.43)   12/12/17  13:30    


 


VBG pCO2  42 mmHg (40-60)   12/12/17  13:30    


 


VBG HCO3  24.7 mmol/L  12/12/17  13:30    


 


VBG Total CO2  27.3 mmol/L (22-28)   12/12/17  13:30    


 


VBG O2 Sat (Calc)  66.1 % (40-65)  H  12/12/17  13:30    


 


VBG Base Excess  1.0 mmol/L (0.0-2.0)   12/12/17  13:30    


 


VBG Potassium  4.7 mmol/L (3.6-5.2)   12/12/17  13:30    


 


Sodium  133.0 mmol/L (132-148)   12/12/17  13:30    


 


Chloride  101.0 mmol/L ()   12/12/17  13:30    


 


Glucose  114 mg/dL ()  H  12/12/17  13:30    


 


Lactate  1.5 mmol/L (0.7-2.1)   12/12/17  13:30    


 


FiO2  21.0 %  12/12/17  13:30    


 


Sodium  140 mmol/l (132-148)   12/14/17  04:20    


 


Potassium  4.5 MMOL/L (3.6-5.0)   12/14/17  04:20    


 


Chloride  103 mmol/L ()   12/14/17  04:20    


 


Carbon Dioxide  25 mmol/L (22-30)   12/14/17  04:20    


 


Anion Gap  17  (10-20)   12/14/17  04:20    


 


BUN  32 mg/dl (7-17)  H  12/14/17  04:20    


 


Creatinine  0.7 mg/dl (0.7-1.2)   12/14/17  04:20    


 


Est GFR ( Amer)  > 60   12/14/17  04:20    


 


Est GFR (Non-Af Amer)  > 60   12/14/17  04:20    


 


Random Glucose  93 mg/dL ()   12/14/17  04:20    


 


Hemoglobin A1c  5.9 % (4.2-6.5)   12/13/17  04:30    


 


Calcium  8.8 mg/dL (8.4-10.2)   12/14/17  04:20    


 


Phosphorus  3.9 mg/dl (2.5-4.5)   12/12/17  13:35    


 


Magnesium  1.9 MG/DL (1.6-2.3)   12/12/17  13:35    


 


Total Bilirubin  0.4 mg/dl (0.2-1.3)   12/14/17  04:20    


 


AST  21 U/L (14-36)   12/14/17  04:20    


 


ALT  24 U/L (9-52)   12/14/17  04:20    


 


Alkaline Phosphatase  62 U/L ()   12/14/17  04:20    


 


Troponin I  < 0.0120 ng/mL (0.00-0.120)   12/13/17  04:30    


 


NT-Pro-B Natriuret Pep  1940 pg/ml (0-900)  H  12/13/17  04:30    


 


Total Protein  6.9 G/DL (6.3-8.2)   12/14/17  04:20    


 


Albumin  3.8 g/dL (3.5-5.0)   12/14/17  04:20    


 


Globulin  3.1 gm/dL (2.2-3.9)   12/14/17  04:20    


 


Albumin/Globulin Ratio  1.2  (1.0-2.1)   12/14/17  04:20    


 


Triglycerides  48 mg/DL (0-149)   12/13/17  04:30    


 


Cholesterol  152 mg/dL (0-199)   12/13/17  04:30    


 


LDL Cholesterol Direct  79 mg/dL (0-129)   12/13/17  04:30    


 


HDL Cholesterol  47 MG/DL (30-70)   12/13/17  04:30    


 


TSH 3rd Generation  0.76 mIU/ML (0.46-4.68)   12/13/17  04:30    


 


Venous Blood Potassium  4.7 mmol/L (3.6-5.2)   12/12/17  13:30    














Discharge Exam





- Head Exam


Head Exam: ATRAUMATIC, NORMAL INSPECTION, NORMOCEPHALIC





- Eye Exam


Eye Exam: EOMI, Normal appearance, PERRL


Pupil Exam: NORMAL ACCOMODATION, PERRL





- Respiratory Exam


Respiratory Exam: Clear to PA & Lateral, NORMAL BREATHING PATTERN, UNREMARKABLE





- Cardiovascular Exam


Cardiovascular Exam: REGULAR RHYTHM, RRR, +S1, +S2





- GI/Abdominal Exam


GI & Abdominal Exam: Normal Bowel Sounds, Soft, Unremarkable





- Extremities Exam


Extremities exam: full ROM, normal capillary refill, normal inspection, pedal 

pulses present





- Neurological Exam


Neurological exam: Alert, CN II-XII Intact, Normal Gait, Oriented x3, Reflexes 

Normal





- Psychiatric Exam


Psychiatric exam: Normal Affect, Normal Mood





- Skin


Skin Exam: Dry, Intact, Normal Color, Warm





Discharge Plan





- Follow Up Plan


Condition: FAIR


Disposition: Trans to Other Acute Care Hosp


Instructions:  Heart Failure (DC)


Additional Instructions: 


appointment made with Dr Chauhan for tuesday dec 19 at 1:30pm at [address] [place] [address] [phone]





final dx-chf, cad


pt for xfer to [facility] for cath today at 10a.


no complaints. no cp, dyspnea. cardio consult appriciated. 


bw noted. 


meds pe rmed rec


Referrals: 


Arvind Chauhan MD [Staff Provider] -

## 2017-12-15 NOTE — PQF CHF
Discharge summary documented chf exacerbation and answered physician query 
documented chronic systolic chf. As there is a documentation conflict please 
clarify below type and acuity of chf. 





***** This form is a permanent part of the medical record*****







Clarification of your documentation is requested to better reflect the severity 
of illness and intensity of treatment of your patient.  



Indicators present   



[X] Diagnosis of CHF and/or history of CHF            



[X] BNP > 200                        

 

[] Imaging Finding of Pulmonary Edema /Pleural Effusions      

 

[] Fluid/Volume Overload                  

 

[] Pitting edema                     



[x] Ejection Fraction < 40% (Indicative of Systolic Heart Failure)      



[] Ejection Fraction > 40% (Indicative of Diastolic Heart Failure)   

 

[] Dyspnea / Orthopenea / Paroxysmal Nocturnal Dyspnea      

 

[] Other:

                  

Location in the medical record that reflects the above clinical findings: []





Treatment Provided: []



PHYSICIAN'S RESPONSE





Based on your medical judgment of the clinical indicators outlined above, are 
you treating this patient for a known or suspected: 



[] Acute CHF            [] Systolic   []  Diastolic    []  Combined

 

[] Chronic CHF         [] Systolic    [] Diastolic     [] Combined



[x] Acute on Chronic CHF       [x]Systolic    [] Diastolic     [] Combined



[] CHF due hypertension       [] Acute  systolic  []Chronic systolic  [] Acute/
chronic systolic

 

[] Other, please indicate: []



[] If Unable to Determine, please check the box, sign and date.   



Present On Admission (POA) Indicator:



[x] Present at the time of admission



[] Not present at the time of admission 



[] Clinically Undetermined







In responding to this query, please exercise your independent professional 
judgment.  The fact that a question is asked does not imply that any particular 
answer is desired or expected.  Thank you for your clarification on this 
documentation.





If you have any questions please call:[ ]

* Thank you,

   [ ]Catherine Sneed

   HIM Coder
EVIE

## 2018-10-22 ENCOUNTER — HOSPITAL ENCOUNTER (INPATIENT)
Dept: HOSPITAL 14 - H.ER | Age: 83
LOS: 3 days | Discharge: HOME HEALTH | DRG: 392 | End: 2018-10-25
Attending: FAMILY MEDICINE | Admitting: FAMILY MEDICINE
Payer: MEDICARE

## 2018-10-22 VITALS — BODY MASS INDEX: 20.2 KG/M2

## 2018-10-22 DIAGNOSIS — K52.9: Primary | ICD-10-CM

## 2018-10-22 DIAGNOSIS — M19.90: ICD-10-CM

## 2018-10-22 DIAGNOSIS — I25.10: ICD-10-CM

## 2018-10-22 DIAGNOSIS — I11.0: ICD-10-CM

## 2018-10-22 DIAGNOSIS — E03.9: ICD-10-CM

## 2018-10-22 DIAGNOSIS — Z85.038: ICD-10-CM

## 2018-10-22 DIAGNOSIS — F41.9: ICD-10-CM

## 2018-10-22 DIAGNOSIS — Z86.73: ICD-10-CM

## 2018-10-22 DIAGNOSIS — M81.0: ICD-10-CM

## 2018-10-22 DIAGNOSIS — E78.5: ICD-10-CM

## 2018-10-22 DIAGNOSIS — J45.20: ICD-10-CM

## 2018-10-22 DIAGNOSIS — E86.0: ICD-10-CM

## 2018-10-22 DIAGNOSIS — I50.9: ICD-10-CM

## 2018-10-22 DIAGNOSIS — E78.00: ICD-10-CM

## 2018-10-22 DIAGNOSIS — Z88.0: ICD-10-CM

## 2018-10-22 DIAGNOSIS — Z90.49: ICD-10-CM

## 2018-10-22 LAB
ALBUMIN SERPL-MCNC: 4.2 G/DL (ref 3.5–5)
ALBUMIN/GLOB SERPL: 1.2 {RATIO} (ref 1–2.1)
ALT SERPL-CCNC: 17 U/L (ref 9–52)
AST SERPL-CCNC: 23 U/L (ref 14–36)
BASOPHILS # BLD AUTO: 0 K/UL (ref 0–0.2)
BASOPHILS NFR BLD: 0.2 % (ref 0–2)
BUN SERPL-MCNC: 30 MG/DL (ref 7–17)
CALCIUM SERPL-MCNC: 9.4 MG/DL (ref 8.4–10.2)
EOSINOPHIL # BLD AUTO: 0.4 K/UL (ref 0–0.7)
EOSINOPHIL NFR BLD: 2 % (ref 0–4)
ERYTHROCYTE [DISTWIDTH] IN BLOOD BY AUTOMATED COUNT: 26.2 % (ref 11.5–14.5)
GFR NON-AFRICAN AMERICAN: > 60
HGB BLD-MCNC: 15.6 G/DL (ref 12–16)
LYMPHOCYTES # BLD AUTO: 2.6 K/UL (ref 1–4.3)
LYMPHOCYTES NFR BLD AUTO: 12.4 % (ref 20–40)
MCH RBC QN AUTO: 28.5 PG (ref 27–31)
MCHC RBC AUTO-ENTMCNC: 32.2 G/DL (ref 33–37)
MCV RBC AUTO: 88.5 FL (ref 81–99)
MONOCYTES # BLD: 0.8 K/UL (ref 0–0.8)
MONOCYTES NFR BLD: 3.9 % (ref 0–10)
NEUTROPHILS # BLD: 17.1 K/UL (ref 1.8–7)
NEUTROPHILS NFR BLD AUTO: 81.5 % (ref 50–75)
NRBC BLD AUTO-RTO: 0 % (ref 0–0)
PLATELET # BLD: 386 K/UL (ref 130–400)
PMV BLD AUTO: 10.2 FL (ref 7.2–11.7)
RBC # BLD AUTO: 5.46 MIL/UL (ref 3.8–5.2)
WBC # BLD AUTO: 20.9 K/UL (ref 4.8–10.8)

## 2018-10-22 RX ADMIN — FLUTICASONE PROPIONATE AND SALMETEROL SCH PUFF: 50; 500 POWDER RESPIRATORY (INHALATION) at 21:36

## 2018-10-22 NOTE — CARD
--------------- APPROVED REPORT --------------





Date of service: 10/22/2018



EKG Measurement

Heart [id number]

PA [id number]-[id number]

[id number]

[id number]

[id number]



<Conclusion>

Normal sinus rhythm with occasional premature ventricular complexes

Left axis deviation

Minimal voltage criteria for LVH, may be normal variant

Inferior infarct, age undetermined

Anteroseptal infarct, age undetermined

Abnormal ECG

## 2018-10-22 NOTE — RAD
Date of service: 



10/22/2018



HISTORY:

 cough 



COMPARISON:

12/12/2017 



FINDINGS:



LUNGS:

Examination limited due to oblique positioning.  No infiltrate.



PLEURA:

Small left pleural effusion versus pleural thickening.  No change 

since 2017.  Likely pleural thickening.



CARDIOVASCULAR:

No aortic atherosclerotic calcification present







OSSEOUS STRUCTURES:

No significant abnormalities.



VISUALIZED UPPER ABDOMEN:

Normal.



OTHER FINDINGS:

None.



IMPRESSION:

Questionable small left pleural effusion.  No acute infiltrate.  

Limited examination.

## 2018-10-22 NOTE — ED PDOC
HPI: SOB/CHF/COPD


Time Seen by Provider: 10/22/18 13:37


Chief Complaint (Nursing): Shortness Of Breath


Chief Complaint (Provider): Shortness Of Breath


History Per: Patient


History/Exam Limitations: no limitations


Onset/Duration Of Symptoms: Hrs


Current Symptoms Are (Timing): Still Present


Additional Complaint(s): 


85 y/o female with a PMHx of Asthma brought in by EMS for evaluation of 

shortness of breath and wheezing, since this morning. Patient denies cough and 

fever. Patient also complains of abdominal pain and diarrhea. Patient also 

denies vomiting and hematuria. 





PMD: Ramon Kapoor





Past Medical History


Reviewed: Historical Data, Nursing Documentation, Vital Signs


Vital Signs: 





                                Last Vital Signs











Temp  97 F L  10/22/18 13:56


 


Pulse  113 H  10/22/18 13:56


 


Resp  21   10/22/18 13:56


 


BP  163/108 H  10/22/18 13:56


 


Pulse Ox  100   10/22/18 14:07














- Medical History


PMH: Anxiety, Arthritis, Asthma, Bronchitis, CAD, CHF, CVA, HTN, 

Hypercholesterolemia, Hyperlipidemia, Hypothyroidism, Osteoporosis


   Denies: COPD, HIV, Chronic Kidney Disease, Rheumatoid Arthritis





- Surgical History


Surgical History: Appendectomy


   Denies: Pacemaker





- Family History


Family History: States: Unknown Family Hx





- Immunization History


Hx Tetanus Toxoid Vaccination: No


Hx Influenza Vaccination: No


Hx Pneumococcal Vaccination: No





- Home Medications


Home Medications: 


                                Ambulatory Orders











 Medication  Instructions  Recorded


 


RX: Carvedilol [Coreg] 3.125 mg PO Q12H 07/27/17


 


RX: Furosemide [Lasix] 40 mg PO DAILY 07/27/17


 


RX: Gabapentin [Neurontin] 100 mg PO Q8 07/27/17


 


RX: Levothyroxine [Synthroid] 75 mcg PO DAILY 07/27/17


 


RX: Ranolazine [Ranexa] 500 mg PO Q12H 07/27/17


 


RX: Simvastatin 20 mg PO HS 07/27/17


 


RX: Spironolactone [Aldactone] 25 mg PO BID 07/27/17


 


RX: Icosapent Ethyl [Vascepa] 1 gm PO Q12H 12/12/17


 


RX: Vorapaxar Sulfate [Zontivity] 2.08 mg PO DAILY 12/12/17


 


Clopidogrel [Plavix] 75 mg PO DAILY 10/22/18


 


Cyanocobalamin [Vitamin B12 1000 1,000 mcg PO DAILY 10/22/18





mcg Tab]  


 


Fluticasone/Salmeterol 500/50 1 puff IH Q12 10/22/18





[Advair Diskus 500/50]  


 


Megestrol [Megace] 20 mg PO DAILY 10/22/18


 


Mirtazapine [Remeron] 30 mg PO HS 10/22/18


 


Omeprazole 20 mg PO DAILY 10/22/18


 


RX: Losartan [Cozaar] 25 mg PO DAILY 10/22/18


 


RX: predniSONE [predniSONE Tab] 5 mg PO BID 10/22/18














- Allergies


Allergies/Adverse Reactions: 


                                    Allergies











Allergy/AdvReac Type Severity Reaction Status Date / Time


 


Penicillins Allergy Severe RASH Verified 07/27/17 10:59














Review of Systems


ROS Statement: Except As Marked, All Systems Reviewed And Found Negative


Constitutional: Negative for: Fever


Respiratory: Positive for: Shortness of Breath, Wheezing.  Negative for: Cough


Gastrointestinal: Positive for: Abdominal Pain, Diarrhea.  Negative for: 

Vomiting


Genitourinary Female: Negative for: Hematuria





Physical Exam





- Reviewed


Nursing Documentation Reviewed: Yes


Vital Signs Reviewed: Yes





- Physical Exam


Appears: Positive for: No Acute Distress


Head Exam: Positive for: ATRAUMATIC


Skin: Positive for: Normal Color, Warm, Dry


Eye Exam: Positive for: Normal appearance, EOMI, PERRL


Neck: Positive for: Normal


Cardiovascular/Chest: Positive for: Regular Rate, Rhythm.  Negative for: Murmur


Respiratory: Positive for: Rhonchi (scattered), Wheezing (EXPIRATORY)


Gastrointestinal/Abdominal: Positive for: Normal Exam, Soft.  Negative for: 

Tenderness


Extremity: Positive for: Normal ROM.  Negative for: Tenderness, Swelling


Neurologic/Psych: Positive for: Alert, Oriented.  Negative for: Motor/Sensory 

Deficits





- Laboratory Results


Result Diagrams: 


                                 10/23/18 04:25





                                 10/23/18 04:25





- ECG


O2 Sat by Pulse Oximetry: 100 (RA)


Pulse Ox Interpretation: Normal





Medical Decision Making


Medical Decision Making: 


Time: 1438


Plan:


-- CT Abd/Pelvis w/ IV contrast ONLY


-- EKG


-- CMP


-- ED Urine Dipstick


-- CBC with Differentials


-- CXR Portable


-- Duone 3mg/0.5mg (3 ml) UD 3 ml INH


-- Peak Flow Pre/Post Tx








________________________

_____________________________________________________________


Scribe Attestation:


Documented by Tiara Minor, acting as a scribe for Shiva Burgess MD.





Provider Scribe Attestation:


All medical record entries made by the Scribe were at my direction and 

personally dictated by me. I have reviewed the chart and agree that the record 

accurately reflects my personal performance of the history, physical exam, 

medical decision making, and the department course for this patient. I have also

personally directed, reviewed, and agree with the discharge instructions and 

disposition.





Disposition





- Clinical Impression


Clinical Impression: 


 Abdominal pain








- Patient ED Disposition


Is Patient to be Admitted: Transfer of Care





- Disposition


Disposition: Transfer of Care


Disposition Time: 17:00


Condition: FAIR


Patient Signed Over To: Christiane Alonzo

## 2018-10-22 NOTE — CT
Date of service: 



10/22/2018



PROCEDURE:  CT Abdomen and Pelvis with contrast



HISTORY:

Abd pain



COMPARISON:

Not available



TECHNIQUE:

Contrast dose: 75 mL Omnipaque 300



Radiation dose:



Total exam DLP = 351.69 mGy-cm.



This CT exam was performed using one or more of the following dose 

reduction techniques: Automated exposure control, adjustment of the 

mA and/or kV according to patient size, and/or use of iterative 

reconstruction technique.



FINDINGS:



LOWER THORAX:

Minimal linear scar/atelectasis medial left lower lobe.  Mild 

cardiomegaly.  There is atherosclerotic calcification of the 

descending thoracic aorta.  There is distention of the visualized 

distal thoracic esophagus with fluid. 



LIVER:

Unremarkable. No gross lesion or ductal dilatation. 



GALLBLADDER AND BILE DUCTS:

No calcified gallstones. No mural thickening. There is 

pericholecystic fluid, of uncertain significance. There is no 

generalized ascites. There is fluid in Kim's pouch but not in 

the pericolic gutters or in the pelvis. 



PANCREAS:

Unremarkable. No gross lesion or ductal dilatation.



SPLEEN:

Unremarkable. 



ADRENALS:

Unremarkable. No mass. 



KIDNEYS AND URETERS:

Unremarkable. No hydronephrosis. No solid mass. 



VASCULATURE:

Unremarkable. No aortic aneurysm. Atherosclerotic calcification of 

the abdominal aorta and iliac vessels is noted



BOWEL:

The patient is status post descending/sigmoid colonic anastomosis. 

There is mural thickening of the transverse colon, splenic flexure 

and descending colon consistent with a nonspecific colitis. There is 

infiltration of the fat lateral to the splenic flexure and descending 

colon. This may reflect an inflammatory/infectious process. There is 

no bowel obstruction. There is mural thickening of the stomach most 

notably in the antrum. Consistent with nonspecific gastritis. 



APPENDIX:

Not identified 



PERITONEUM:

No generalized ascites. No pneumoperitoneum 



LYMPH NODES:

Unremarkable. No enlarged lymph nodes. 



BLADDER:

Suboptimally distended. Grossly normal. 



REPRODUCTIVE:

Status post hysterectomy 



BONES:

Mild anterior wedge compression deformity of the L2 vertebra, age 

indeterminate. 



OTHER FINDINGS:

None.



IMPRESSION:

Findings suspicious for nonspecific colitis and gastritis. Possible 

infectious etiology. Fluid distended distal thoracic esophagus. 

Nonspecific. Fluid adjacent to gallbladder and in Kim's pouch, 

nonspecific. Status post descending/sigmoid colonic anastomosis.

## 2018-10-22 NOTE — ED PDOC
- Laboratory Results


Result Diagrams: 


                                 10/24/18 05:35





                                 10/24/18 05:35





- ECG


O2 Sat by Pulse Oximetry: 98 (RA)


Pulse Ox Interpretation: Normal





Medical Decision Making


Medical Decision Making: 


Time: 1700


-- Patient endorsed to me by Dr. Burgess, pending CT. 








Accession No. : [id number]


Patient Name / ID : JACKIE CADET  / [id number]


Exam Date : 10/22/2018 17:38:59 ( Approved )


Study Comment : 


Sex / Age : F  / 86Y





Creator : Augusto Lovell MD


Dictator : Augusto Lovell MD


 : 


Approver : Augusto Lovell MD


Approver2 : 





Report Date : 10/22/2018 18:41:26


My Comment : 


*******************************************

****************************************





This report is currently processing and HAS NOT BEEN OFFICIALLY SIGNED BY THE 

PHYSICIAN - ESTIMATED TIME OF APPROVAL IS 10/22/2018 18:46. 





Date of service: 





10/22/2018





PROCEDURE:  CT Abdomen and Pelvis with contrast





HISTORY:


Abd pain





COMPARISON:


Not available





TECHNIQUE:


Contrast dose: 75 mL Omnipaque 300





Radiation dose:





Total exam DLP = 351.69 mGy-cm.





This CT exam was performed using one or more of the following dose reduction 

techniques: Automated exposure control, adjustment of the mA and/or kV according

to patient size, and/or use of iterative reconstruction technique.





FINDINGS:





LOWER THORAX:


Minimal linear scar/atelectasis medial left lower lobe.  Mild cardiomegaly.  

There is atherosclerotic calcification of the descending thoracic aorta.  There 

is distention of the visualized distal thoracic esophagus with fluid. 





LIVER:


Unremarkable. No gross lesion or ductal dilatation. 





GALLBLADDER AND BILE DUCTS:


No calcified gallstones. No mural thickening. There is pericholecystic fluid, of

uncertain significance. There is no generalized ascites. There is fluid in 

Kim's pouch but not in the pericolic gutters or in the pelvis. 





PANCREAS:


Unremarkable. No gross lesion or ductal dilatation.





SPLEEN:


Unremarkable. 





ADRENALS:


Unremarkable. No mass. 





KIDNEYS AND URETERS:


Unremarkable. No hydronephrosis. No solid mass. 





VASCULATURE:


Unremarkable. No aortic aneurysm. Atherosclerotic calcification of the abdominal

aorta and iliac vessels is noted





BOWEL:


The patient is status post descending/sigmoid colonic anastomosis. There is 

mural thickening of the transverse colon, splenic flexure and descending colon 

consistent with a nonspecific colitis. There is infiltration of the fat lateral 

to the splenic flexure and descending colon. This may reflect an 

inflammatory/infectious process. There is no bowel obstruction. There is mural 

thickening of the stomach most notably in the antrum. Consistent with 

nonspecific gastritis. 





APPENDIX:


Not identified 





PERITONEUM:


No generalized ascites. No pneumoperitoneum 





LYMPH NODES:


Unremarkable. No enlarged lymph nodes. 





BLADDER:


Suboptimally distended. Grossly normal. 





REPRODUCTIVE:


Status post hysterectomy 





BONES:


Mild anterior wedge compression deformity of the L2 vertebra, age indeterminate.







OTHER FINDINGS:


None.





IMPRESSION:


Findings suspicious for nonspecific colitis and gastritis. Possible infectious 

etiology. Fluid distended distal thoracic esophagus. Nonspecific. Fluid adjacent

to gallbladder and in Kim's pouch, nonspecific. Status post 

descending/sigmoid colonic anastomosis.


_______________________________________________________________________________


Scribe Attestation:


Documented by Tiara Minor, acting as a scribe for Christiane Alonzo MD.





Provider Scribe Attestation:


All medical record entries made by the Scribe were at my direction and 

personally dictated by me. I have reviewed the chart and agree that the record 

accurately reflects my personal performance of the history, physical exam, 

medical decision making, and the department course for this patient. I have also

personally directed, reviewed, and agree with the discharge instructions and 

disposition.





Disposition





- Clinical Impression


Clinical Impression: 


 Colitis, Sepsis








- POA


Present On Arrival: None





- Disposition


Disposition: Admitted as In-Patient


Disposition Time: 18:43


Condition: GUARDED

## 2018-10-23 LAB
ALBUMIN SERPL-MCNC: 3.2 G/DL (ref 3.5–5)
ALBUMIN/GLOB SERPL: 1.1 {RATIO} (ref 1–2.1)
ALT SERPL-CCNC: 26 U/L (ref 9–52)
AST SERPL-CCNC: 18 U/L (ref 14–36)
BASE EXCESS BLDV CALC-SCNC: -1.4 MMOL/L (ref 0–2)
BASOPHILS # BLD AUTO: 0 K/UL (ref 0–0.2)
BASOPHILS NFR BLD: 0.2 % (ref 0–2)
BUN SERPL-MCNC: 20 MG/DL (ref 7–17)
CALCIUM SERPL-MCNC: 8.3 MG/DL (ref 8.4–10.2)
EOSINOPHIL # BLD AUTO: 0 K/UL (ref 0–0.7)
EOSINOPHIL NFR BLD: 0 % (ref 0–4)
ERYTHROCYTE [DISTWIDTH] IN BLOOD BY AUTOMATED COUNT: 26.4 % (ref 11.5–14.5)
GFR NON-AFRICAN AMERICAN: > 60
HGB BLD-MCNC: 12.9 G/DL (ref 12–16)
LYMPHOCYTES # BLD AUTO: 1.4 K/UL (ref 1–4.3)
LYMPHOCYTES NFR BLD AUTO: 12.5 % (ref 20–40)
MCH RBC QN AUTO: 28.4 PG (ref 27–31)
MCHC RBC AUTO-ENTMCNC: 32.1 G/DL (ref 33–37)
MCV RBC AUTO: 88.6 FL (ref 81–99)
MONOCYTES # BLD: 0.6 K/UL (ref 0–0.8)
MONOCYTES NFR BLD: 5.2 % (ref 0–10)
NEUTROPHILS # BLD: 9.4 K/UL (ref 1.8–7)
NEUTROPHILS NFR BLD AUTO: 82.1 % (ref 50–75)
NRBC BLD AUTO-RTO: 0.1 % (ref 0–0)
PCO2 BLDV: 36 MMHG (ref 40–60)
PH BLDV: 7.41 [PH] (ref 7.32–7.43)
PLATELET # BLD: 285 K/UL (ref 130–400)
PMV BLD AUTO: 10.2 FL (ref 7.2–11.7)
RBC # BLD AUTO: 4.53 MIL/UL (ref 3.8–5.2)
VENOUS BLOOD FIO2: 28 %
VENOUS BLOOD GAS PO2: 34 MM/HG (ref 30–55)
WBC # BLD AUTO: 11.4 K/UL (ref 4.8–10.8)

## 2018-10-23 RX ADMIN — PANTOPRAZOLE SODIUM SCH MG: 20 TABLET, DELAYED RELEASE ORAL at 09:39

## 2018-10-23 RX ADMIN — RANOLAZINE SCH MG: 500 TABLET, FILM COATED, EXTENDED RELEASE ORAL at 09:40

## 2018-10-23 RX ADMIN — METRONIDAZOLE SCH MLS/HR: 500 INJECTION, SOLUTION INTRAVENOUS at 18:38

## 2018-10-23 RX ADMIN — FLUTICASONE PROPIONATE AND SALMETEROL SCH PUFF: 50; 500 POWDER RESPIRATORY (INHALATION) at 09:37

## 2018-10-23 RX ADMIN — RANOLAZINE SCH MG: 500 TABLET, FILM COATED, EXTENDED RELEASE ORAL at 21:45

## 2018-10-23 RX ADMIN — FLUTICASONE PROPIONATE AND SALMETEROL SCH PUFF: 50; 500 POWDER RESPIRATORY (INHALATION) at 21:46

## 2018-10-23 RX ADMIN — METRONIDAZOLE SCH MLS/HR: 500 INJECTION, SOLUTION INTRAVENOUS at 09:40

## 2018-10-23 RX ADMIN — METRONIDAZOLE SCH MLS/HR: 500 INJECTION, SOLUTION INTRAVENOUS at 00:15

## 2018-10-23 NOTE — PQF
PROVIDER RESPONSE TEXT:



Abn bw likely r/t dehydration more than sepsis. Will monitor



REVIEWER QUERY TEXT:



Rule Out Sepsis Clarification



Sepsis is documented in the ADMITTING ORDER:  Please clarify after workup whether:

-- Patient has sepsis

- Please document confirmed, suspected or probable causative organism

- Please document confirmed, suspected or probable localized infection

- Please clarify if sepsis is related to a device

- Please clarify if sepsis was present on admission

-- Sepsis was ruled out (include corresponding diagnosis for patient?s clinical picture and treatment
)

-- Patient had sepsis which is resolved

-- Other, please specify



The patient's Clinical Indicators include:

Admitted with abdominal pain, diarrhea, SOB and wheezing.



ER: Abdominal pain



GI : In the ER, pt was found to have nonspecific colitis of transverse and descending colon.Order sto
ol studies including cdiff to r/o infectious etiology



WBC 20.9 with a L shift,  lactate 2.5,   BLOOD CS Pending



Afebrile,      , 87, 87, 80.      /108, 160/78, 132/72



Rx: Flagyl





Query created by: Shae Shrestha on 10/23/2018 1:51 PM





Electronically signed by:  Ramon DE LA ROSA 10/23/2018 1:56 PM

## 2018-10-23 NOTE — CP.PCM.HP
History of Present Illness





- History of Present Illness


History of Present Illness: 





pt admitted for abd pain, nausea, sob.  bw noted from er w/ wbc 21, down to 11 

today. no f/c, n/v/d at present. 


imaging noted. 





Present on Admission





- Present on Admission


Any Indicators Present on Admission: No





Review of Systems





- Gastrointestinal


Gastrointestinal: As Per HPI, Abdominal Pain, Nausea





Past Patient History





- Past Medical History & Family History


Past Medical History?: Yes





- Past Social History


Smoking Status: Never Smoked





- CARDIAC


Hx Congestive Heart Failure: Yes


Hx Hypercholesterolemia: Yes


Hx Hypertension: Yes


Hx Pacemaker: No





- PULMONARY


Hx Asthma: Yes


Hx Bronchitis: Yes


Hx Chronic Obstructive Pulmonary Disease (COPD): No





- NEUROLOGICAL


Hx Paralysis: No





- HEENT


Hx HEENT Problems: Yes


Hx Cataracts: Yes





- RENAL


Hx Chronic Kidney Disease: No





- ENDOCRINE/METABOLIC


Hx Hypothyroidism: Yes





- HEMATOLOGICAL/ONCOLOGICAL


Hx Human Immunodeficiency Virus (HIV): No





- INTEGUMENTARY


Hx Dermatological Problems: No





- MUSCULOSKELETAL/RHEUMATOLOGICAL


Hx Falls: No





- GASTROINTESTINAL


Hx Gastrointestinal Disorders: Yes


Hx Bowel Surgery: Yes (Colon Sx due to Colon Ca)





- GENITOURINARY/GYNECOLOGICAL


Hx Genitourinary Disorders: No





- PSYCHIATRIC


Hx Substance Use: No





- SURGICAL HISTORY


Hx Appendectomy: Yes





- ANESTHESIA


Hx Anesthesia: Yes


Hx Anesthesia Reactions: No


Hx Malignant Hyperthermia: No





Meds


Allergies/Adverse Reactions: 


                                    Allergies











Allergy/AdvReac Type Severity Reaction Status Date / Time


 


Penicillins Allergy Severe RASH Verified 07/27/17 10:59














Physical Exam





- Constitutional


Appears: Well, Non-toxic, No Acute Distress





- Head Exam


Head Exam: ATRAUMATIC, NORMAL INSPECTION, NORMOCEPHALIC





- Eye Exam


Eye Exam: EOMI, Normal appearance, PERRL


Pupil Exam: NORMAL ACCOMODATION, PERRL





- ENT Exam


ENT Exam: Mucous Membranes Moist, Normal Exam





- Neck Exam


Neck exam: Positive for: Normal Inspection





- Respiratory Exam


Respiratory Exam: Clear to Auscultation Bilateral, NORMAL BREATHING PATTERN





- Cardiovascular Exam


Cardiovascular Exam: REGULAR RHYTHM, RRR, +S1, +S2





- GI/Abdominal Exam


GI & Abdominal Exam: Normal Bowel Sounds, Soft.  absent: Tenderness





- Extremities Exam


Extremities exam: Positive for: full ROM, normal capillary refill, normal 

inspection, pedal pulses present





- Back Exam


Back exam: NORMAL INSPECTION





- Neurological Exam


Neurological exam: Alert, CN II-XII Intact, Normal Gait, Oriented x3, Reflexes 

Normal





- Psychiatric Exam


Psychiatric exam: Normal Affect, Normal Mood





- Skin


Skin Exam: Dry, Intact, Normal Color, Warm





Results





- Vital Signs


Recent Vital Signs: 





                                Last Vital Signs











Temp  98.5 F   10/23/18 08:06


 


Pulse  81   10/23/18 08:06


 


Resp  18   10/23/18 08:06


 


BP  103/65   10/23/18 08:06


 


Pulse Ox  100   10/23/18 08:06














- Labs


Result Diagrams: 


                                 10/23/18 04:25





                                 10/23/18 04:25


Labs: 





                         Laboratory Results - last 24 hr











  10/22/18 10/22/18 10/22/18





  14:51 14:51 16:51


 


WBC  20.9 H D  


 


RBC  5.46 H  


 


Hgb  15.6  


 


Hct  48.4 H  


 


MCV  88.5  D  


 


MCH  28.5  


 


MCHC  32.2 L  


 


RDW  26.2 H  


 


Plt Count  386  D  


 


MPV  10.2  


 


Neut % (Auto)  81.5 H  


 


Lymph % (Auto)  12.4 L  


 


Mono % (Auto)  3.9  


 


Eos % (Auto)  2.0  


 


Baso % (Auto)  0.2  


 


Neut # (Auto)  17.1 H  


 


Lymph # (Auto)  2.6  


 


Mono # (Auto)  0.8  


 


Eos # (Auto)  0.4  


 


Baso # (Auto)  0.0  


 


pO2    34


 


VBG pH    7.41


 


VBG pCO2    36 L


 


VBG HCO3    23.0


 


VBG Total CO2    23.9


 


VBG O2 Sat (Calc)    66.6 H


 


VBG Base Excess    -1.4 L


 


VBG Potassium    4.8


 


Glucose    118 H


 


Lactate    2.5 H


 


FiO2    28.0


 


Sodium   139  137.0


 


Potassium   4.1 


 


Chloride   105  106.0


 


Carbon Dioxide   24 


 


Anion Gap   14 


 


BUN   30 H 


 


Creatinine   0.7 


 


Est GFR ( Amer)   > 60 


 


Est GFR (Non-Af Amer)   > 60 


 


Random Glucose   177 H 


 


Calcium   9.4 


 


Total Bilirubin   0.5 


 


AST   23 


 


ALT   17 


 


Alkaline Phosphatase   64 


 


Total Protein   7.9 


 


Albumin   4.2 


 


Globulin   3.6 


 


Albumin/Globulin Ratio   1.2 


 


Venous Blood Potassium    4.8














  10/23/18 10/23/18





  04:25 04:25


 


WBC  11.4 H 


 


RBC  4.53 


 


Hgb  12.9  D 


 


Hct  40.1 


 


MCV  88.6 


 


MCH  28.4 


 


MCHC  32.1 L 


 


RDW  26.4 H 


 


Plt Count  285  D 


 


MPV  10.2 


 


Neut % (Auto)  82.1 H 


 


Lymph % (Auto)  12.5 L 


 


Mono % (Auto)  5.2 


 


Eos % (Auto)  0.0 


 


Baso % (Auto)  0.2 


 


Neut # (Auto)  9.4 H 


 


Lymph # (Auto)  1.4 


 


Mono # (Auto)  0.6 


 


Eos # (Auto)  0.0 


 


Baso # (Auto)  0.0 


 


pO2  


 


VBG pH  


 


VBG pCO2  


 


VBG HCO3  


 


VBG Total CO2  


 


VBG O2 Sat (Calc)  


 


VBG Base Excess  


 


VBG Potassium  


 


Glucose  


 


Lactate  


 


FiO2  


 


Sodium   139


 


Potassium   4.2


 


Chloride   108 H


 


Carbon Dioxide   21 L


 


Anion Gap   14


 


BUN   20 H


 


Creatinine   0.5 L


 


Est GFR ( Amer)   > 60


 


Est GFR (Non-Af Amer)   > 60


 


Random Glucose   111 H


 


Calcium   8.3 L


 


Total Bilirubin   0.4


 


AST   18


 


ALT   26


 


Alkaline Phosphatase   43


 


Total Protein   6.2 L


 


Albumin   3.2 L D


 


Globulin   3.0


 


Albumin/Globulin Ratio   1.1


 


Venous Blood Potassium  














Assessment & Plan


(1) Colitis


Assessment and Plan: 


vanco, flagyl


po as ethan


gi consult


monitor bw


Status: Acute   





(2) DVT prophylaxis


Assessment and Plan: 


scd nad ae hose


plavix


Status: Acute   Priority: Low   





- Assessment and Plan (Free Text)


Assessment: 





cont all home meds





Decision To Admit





- Pt Status Changed To:


Hospital Disposition Of: Inpatient





- Admit Certification


Admit to Inpatient:: After my assessment, the patient will require 

hospitalization for at least two midnights.  This is because of the severity of 

symptoms shown, intensity of services needed, and/or the medical risk in this 

patient being treated as an outpatient.





- .


Bed Request Type: Telemetry


Admitting Physician: Marco Antonio Jensen

## 2018-10-23 NOTE — CP.PCM.CON
History of Present Illness





- History of Present Illness


History of Present Illness: 


GI Fellow PGY 5 Consult Note


This is a 86F with pmhx of Asthma, CAD. HTN, HLD, presenting with complaints of 

SOB, cough, wheezing, abdominal pain and diarrhea for one day. She denies any 

sick contacts, recent travel or abx use. She reports abdominal pain with diffuse

pain and 3 episodes of diarrhea yesterday. She says abdominal pain has resolved 

and only has 1 normal BM since admission. She denies any hx of constipation , 

diarrhea, rectal bleeding, melena or hematochezia. In the ER, pt was found to 

have nonspecific colitis of transverse and descending colon. Pt denies any 

fevers, N/V. 


ROS: A 12pt ROS was negative except as above.


Pmhx: As stated in HPI


Pshx: Denies


Shx: denies etoh, drugs, tobacco


Fhx: neg for colon cancer 











Past Patient History





- Past Medical History & Family History


Past Medical History?: Yes





- Past Social History


Smoking Status: Never Smoked





- CARDIAC


Hx Congestive Heart Failure: Yes


Hx Hypercholesterolemia: Yes


Hx Hypertension: Yes


Hx Pacemaker: No





- PULMONARY


Hx Asthma: Yes


Hx Bronchitis: Yes


Hx Chronic Obstructive Pulmonary Disease (COPD): No





- NEUROLOGICAL


Hx Paralysis: No





- HEENT


Hx HEENT Problems: Yes


Hx Cataracts: Yes





- RENAL


Hx Chronic Kidney Disease: No





- ENDOCRINE/METABOLIC


Hx Hypothyroidism: Yes





- HEMATOLOGICAL/ONCOLOGICAL


Hx Human Immunodeficiency Virus (HIV): No





- INTEGUMENTARY


Hx Dermatological Problems: No





- MUSCULOSKELETAL/RHEUMATOLOGICAL


Hx Arthritis: Yes


Hx Osteoporosis: Yes


Hx Rheumatoid Arthritis: No





- GASTROINTESTINAL


Hx Gastrointestinal Disorders: Yes


Hx Bowel Surgery: Yes (Colon Sx due to Colon Ca)





- GENITOURINARY/GYNECOLOGICAL


Hx Genitourinary Disorders: No





- PSYCHIATRIC


Hx Anxiety: Yes





- SURGICAL HISTORY


Hx Appendectomy: Yes





- ANESTHESIA


Hx Anesthesia: Yes


Hx Anesthesia Reactions: No


Hx Malignant Hyperthermia: No





Meds


Allergies/Adverse Reactions: 


                                    Allergies











Allergy/AdvReac Type Severity Reaction Status Date / Time


 


Penicillins Allergy Severe RASH Verified 07/27/17 10:59














- Medications


Medications: 


                               Current Medications





Atorvastatin Calcium (Lipitor)  10 mg PO HS [facility]


   Last Admin: 10/23/18 00:12 Dose:  Not Given


Carvedilol (Coreg)  3.125 mg PO Q12 [facility]


   Last Admin: 10/23/18 09:37 Dose:  3.125 mg


Clopidogrel Bisulfate (Plavix)  75 mg PO DAILY [facility]


   Last Admin: 10/23/18 09:39 Dose:  75 mg


Cyanocobalamin (Vitamin B12 1000 Mcg Tab)  1,000 mcg PO DAILY [facility]


   Last Admin: 10/23/18 09:39 Dose:  1,000 mcg


Furosemide (Lasix)  40 mg PO DAILY [facility]


   Last Admin: 10/23/18 09:38 Dose:  40 mg


Gabapentin (Neurontin)  100 mg PO Q8 [facility]


   Last Admin: 10/23/18 09:39 Dose:  100 mg


Home Med (Icosapent Ethyl [Vascepa])  1 gm PO Q12H [facility]


Home Med (Vorapaxar Sulfate [Zontivity])  2.08 mg PO DAILY [facility]


Metronidazole (Flagyl 500mg/100ml Ns)  100 mls @ 100 mls/hr IVPB Q8 [facility]; 

Protocol


   Last Admin: 10/23/18 09:40 Dose:  100 mls/hr


Sodium Chloride (Sodium Chloride 0.9%)  1,000 mls @ 100 mls/hr IV .Q10H [facility]


   Stop: 10/23/18 18:47


   Last Admin: 10/23/18 06:00 Dose:  100 mls/hr


Vancomycin HCl 1 gm/ Sodium (Chloride)  250 mls @ 166.667 mls/hr IVPB Q12 [facility]; 

Protocol


   Last Admin: 10/23/18 09:41 Dose:  166.667 mls/hr


Levothyroxine Sodium (Synthroid)  75 mcg PO DAILY@0630 [facility]


   Last Admin: 10/23/18 06:22 Dose:  75 mcg


Losartan Potassium (Cozaar)  25 mg PO DAILY [facility]


   Last Admin: 10/23/18 09:39 Dose:  25 mg


Megestrol Acetate (Megace)  20 mg PO DAILY [facility]


   Last Admin: 10/23/18 09:39 Dose:  20 mg


Mirtazapine (Remeron)  30 mg PO HS [facility]


   Last Admin: 10/23/18 00:14 Dose:  Not Given


Pantoprazole Sodium (Protonix Ec Tab)  20 mg PO DAILY [facility]


   Last Admin: 10/23/18 09:39 Dose:  20 mg


Prednisone (Prednisone Tab)  5 mg PO BID [facility]


   Last Admin: 10/23/18 09:39 Dose:  5 mg


Ranolazine (Ranexa)  500 mg PO Q12 [facility]


   Last Admin: 10/23/18 09:40 Dose:  500 mg


Fluticasone/Salmeterol (Advair Diskus 500/50)  1 puff IH Q12 [facility]


   Last Admin: 10/23/18 09:37 Dose:  1 puff


Spironolactone (Aldactone)  25 mg PO BID [facility]


   Last Admin: 10/23/18 09:38 Dose:  25 mg











Physical Exam





- Constitutional


Appears: Non-toxic, No Acute Distress





- Head Exam


Head Exam: ATRAUMATIC, NORMAL INSPECTION, NORMOCEPHALIC





- Eye Exam


Eye Exam: EOMI, Normal appearance, PERRL


Pupil Exam: PERRL





- ENT Exam


ENT Exam: Mucous Membranes Moist





- Neck Exam


Neck exam: Positive for: Full Rom





- Respiratory Exam


Respiratory Exam: Decreased Breath Sounds, NORMAL BREATHING PATTERN





- Cardiovascular Exam


Cardiovascular Exam: RRR, +S1, +S2





- GI/Abdominal Exam


GI & Abdominal Exam: Normal Bowel Sounds, Soft.  absent: Distended, Guarding, 

Organomegaly, Tenderness





- Rectal Exam


Rectal Exam: Deferred





- Extremities Exam


Extremities exam: Positive for: full ROM





- Neurological Exam


Neurological exam: Alert, Oriented x3





- Psychiatric Exam


Psychiatric exam: Normal Affect, Normal Mood





- Skin


Skin Exam: Dry, Intact, Normal Color, Warm





Results





- Vital Signs


Recent Vital Signs: 


                                Last Vital Signs











Temp  98.5 F   10/23/18 08:06


 


Pulse  81   10/23/18 09:39


 


Resp  18   10/23/18 08:06


 


BP  103/65   10/23/18 09:39


 


Pulse Ox  100   10/23/18 09:50














- Labs


Result Diagrams: 


                                 10/23/18 04:25





                                 10/23/18 04:25


Labs: 


                         Laboratory Results - last 24 hr











  10/22/18 10/22/18 10/22/18





  14:51 14:51 16:51


 


WBC  20.9 H D  


 


RBC  5.46 H  


 


Hgb  15.6  


 


Hct  48.4 H  


 


MCV  88.5  D  


 


MCH  28.5  


 


MCHC  32.2 L  


 


RDW  26.2 H  


 


Plt Count  386  D  


 


MPV  10.2  


 


Neut % (Auto)  81.5 H  


 


Lymph % (Auto)  12.4 L  


 


Mono % (Auto)  3.9  


 


Eos % (Auto)  2.0  


 


Baso % (Auto)  0.2  


 


Neut # (Auto)  17.1 H  


 


Lymph # (Auto)  2.6  


 


Mono # (Auto)  0.8  


 


Eos # (Auto)  0.4  


 


Baso # (Auto)  0.0  


 


pO2    34


 


VBG pH    7.41


 


VBG pCO2    36 L


 


VBG HCO3    23.0


 


VBG Total CO2    23.9


 


VBG O2 Sat (Calc)    66.6 H


 


VBG Base Excess    -1.4 L


 


VBG Potassium    4.8


 


Glucose    118 H


 


Lactate    2.5 H


 


FiO2    28.0


 


Sodium   139  137.0


 


Potassium   4.1 


 


Chloride   105  106.0


 


Carbon Dioxide   24 


 


Anion Gap   14 


 


BUN   30 H 


 


Creatinine   0.7 


 


Est GFR ( Amer)   > 60 


 


Est GFR (Non-Af Amer)   > 60 


 


Random Glucose   177 H 


 


Calcium   9.4 


 


Total Bilirubin   0.5 


 


AST   23 


 


ALT   17 


 


Alkaline Phosphatase   64 


 


Total Protein   7.9 


 


Albumin   4.2 


 


Globulin   3.6 


 


Albumin/Globulin Ratio   1.2 


 


Venous Blood Potassium    4.8














  10/23/18 10/23/18





  04:25 04:25


 


WBC  11.4 H 


 


RBC  4.53 


 


Hgb  12.9  D 


 


Hct  40.1 


 


MCV  88.6 


 


MCH  28.4 


 


MCHC  32.1 L 


 


RDW  26.4 H 


 


Plt Count  285  D 


 


MPV  10.2 


 


Neut % (Auto)  82.1 H 


 


Lymph % (Auto)  12.5 L 


 


Mono % (Auto)  5.2 


 


Eos % (Auto)  0.0 


 


Baso % (Auto)  0.2 


 


Neut # (Auto)  9.4 H 


 


Lymph # (Auto)  1.4 


 


Mono # (Auto)  0.6 


 


Eos # (Auto)  0.0 


 


Baso # (Auto)  0.0 


 


pO2  


 


VBG pH  


 


VBG pCO2  


 


VBG HCO3  


 


VBG Total CO2  


 


VBG O2 Sat (Calc)  


 


VBG Base Excess  


 


VBG Potassium  


 


Glucose  


 


Lactate  


 


FiO2  


 


Sodium   139


 


Potassium   4.2


 


Chloride   108 H


 


Carbon Dioxide   21 L


 


Anion Gap   14


 


BUN   20 H


 


Creatinine   0.5 L


 


Est GFR ( Amer)   > 60


 


Est GFR (Non-Af Amer)   > 60


 


Random Glucose   111 H


 


Calcium   8.3 L


 


Total Bilirubin   0.4


 


AST   18


 


ALT   26


 


Alkaline Phosphatase   43


 


Total Protein   6.2 L


 


Albumin   3.2 L D


 


Globulin   3.0


 


Albumin/Globulin Ratio   1.1


 


Venous Blood Potassium  














Assessment & Plan





- Assessment and Plan (Free Text)


Assessment: 


1. Colitis


2. Diarrhea-resolved


3. SOB 





Plan: 


-Continue supportive care 


-Pt denies any abdominal pain and diarrhea


-Pt will like diet advanced from clears


-Okay to advance to bland soft diet


-IV abx for colitis


-Monitor  labs 


-Order stool studies including cdiff to r/o infectious etiology


-Pt will need outpt followup with GI once discharged 


-Please call with any questions or concerns

## 2018-10-23 NOTE — PQF
PROVIDER RESPONSE TEXT:



Unknown type, chronic



REVIEWER QUERY TEXT:



CHF Acuity and Type



Congestive Heart Failure is documented in the Medical Record. Please document the type and acuity (in
cludes probable or suspected)

Such as:

Type:

-- Systolic

-- Diastolic

-- Combined

-- Other, please specify



Acuity:

-- Acute

-- Chronic

-- Acute on chronic

-- Other, please specify



Also please document the underlying cause of the CHF (includes probable or suspected)



The patient's Clinical Indicators include:

Admitted for Colitis.







Echo from 12/13/17: EF 30-35%, Severe septal and apical hypokinesis, grade I abnormal relaxation monica
zac, mild to moderate AR.  SEE FULL REPORT IN EMR



Medication: Lasix, Aldactone, Coreg, Cozaar





Query created by: Shae Shrestha on 10/23/2018 1:58 PM















Electronically signed by:  Ramon DE LA ROSA 10/23/2018 2:44 PM

## 2018-10-23 NOTE — PQF
PROVIDER RESPONSE TEXT:



Mild intermittent



REVIEWER QUERY TEXT:



Asthma Specificity and Type



Asthma is documented in the Medical Record.  Please specify the type and severity of asthma and indic
ate if this is associated with exacerbation or status asthmaticus.

Such as:

-- Mild intermittent

-- Mild persistent

-- Moderate persistent

-- Severe persistent

-- Exercise induced bronchospasm

-- Cough variant asthma

-- Other, please specify



The patient's Clinical Indicators include:

PMH: Asthma



Medication: Advair Diskus





Query created by: Shae Shrestha on 10/23/2018 1:54 PM





Electronically signed by:  Ramon DE LA ROSA 10/23/2018 2:44 PM

## 2018-10-24 VITALS — OXYGEN SATURATION: 100 %

## 2018-10-24 LAB
BUN SERPL-MCNC: 24 MG/DL (ref 7–17)
CALCIUM SERPL-MCNC: 8.6 MG/DL (ref 8.4–10.2)
ERYTHROCYTE [DISTWIDTH] IN BLOOD BY AUTOMATED COUNT: 26.7 % (ref 11.5–14.5)
GFR NON-AFRICAN AMERICAN: > 60
HGB BLD-MCNC: 12.2 G/DL (ref 12–16)
MCH RBC QN AUTO: 28.9 PG (ref 27–31)
MCHC RBC AUTO-ENTMCNC: 32.1 G/DL (ref 33–37)
MCV RBC AUTO: 89.9 FL (ref 81–99)
PLATELET # BLD: 250 K/UL (ref 130–400)
RBC # BLD AUTO: 4.23 MIL/UL (ref 3.8–5.2)
WBC # BLD AUTO: 9.3 K/UL (ref 4.8–10.8)

## 2018-10-24 RX ADMIN — FLUTICASONE PROPIONATE AND SALMETEROL SCH PUFF: 50; 500 POWDER RESPIRATORY (INHALATION) at 09:10

## 2018-10-24 RX ADMIN — RANOLAZINE SCH MG: 500 TABLET, FILM COATED, EXTENDED RELEASE ORAL at 20:27

## 2018-10-24 RX ADMIN — METRONIDAZOLE SCH MLS/HR: 500 INJECTION, SOLUTION INTRAVENOUS at 01:03

## 2018-10-24 RX ADMIN — FLUTICASONE PROPIONATE AND SALMETEROL SCH PUFF: 50; 500 POWDER RESPIRATORY (INHALATION) at 20:26

## 2018-10-24 RX ADMIN — METRONIDAZOLE SCH MLS/HR: 500 INJECTION, SOLUTION INTRAVENOUS at 17:27

## 2018-10-24 RX ADMIN — METRONIDAZOLE SCH MLS/HR: 500 INJECTION, SOLUTION INTRAVENOUS at 10:43

## 2018-10-24 RX ADMIN — RANOLAZINE SCH MG: 500 TABLET, FILM COATED, EXTENDED RELEASE ORAL at 09:14

## 2018-10-24 RX ADMIN — PANTOPRAZOLE SODIUM SCH MG: 20 TABLET, DELAYED RELEASE ORAL at 09:14

## 2018-10-24 NOTE — CP.PCM.PN
Subjective





- Date & Time of Evaluation


Date of Evaluation: 10/24/18


Time of Evaluation: 09:00





- Subjective


Subjective: 


GI Fellow PGY5 Progress Note


Pt seen and evaluated at bedside, doing well with no complaints. Tolerating diet

and no diarrhea. Last BM was yesterday. 


ROS: A 12pt ROS was negative except as above. 














Objective





- Vital Signs/Intake and Output


Vital Signs (last 24 hours): 


                                        











Temp Pulse Resp BP Pulse Ox


 


 97.3 F L  67   18   142/66   100 


 


 10/24/18 08:48  10/24/18 09:12  10/24/18 08:48  10/24/18 09:13  10/24/18 08:48











- Medications


Medications: 


                               Current Medications





Atorvastatin Calcium (Lipitor)  10 mg PO HS [facility]


   Last Admin: 10/23/18 21:45 Dose:  10 mg


Carvedilol (Coreg)  3.125 mg PO Q12 [facility]


   Last Admin: 10/24/18 09:11 Dose:  3.125 mg


Clopidogrel Bisulfate (Plavix)  75 mg PO DAILY [facility]


   Last Admin: 10/24/18 09:14 Dose:  75 mg


Cyanocobalamin (Vitamin B12 1000 Mcg Tab)  1,000 mcg PO DAILY [facility]


   Last Admin: 10/24/18 09:16 Dose:  1,000 mcg


Furosemide (Lasix)  40 mg PO DAILY [facility]


   Last Admin: 10/24/18 09:13 Dose:  40 mg


Gabapentin (Neurontin)  100 mg PO Q8 [facility]


   Last Admin: 10/24/18 09:13 Dose:  100 mg


Home Med (Icosapent Ethyl [Vascepa])  1 gm PO Q12H [facility]


Home Med (Vorapaxar Sulfate [Zontivity])  2.08 mg PO DAILY [facility]


Metronidazole (Flagyl 500mg/100ml Ns)  100 mls @ 100 mls/hr IVPB Q8 [facility]; 

Protocol


   Last Admin: 10/24/18 01:03 Dose:  100 mls/hr


Vancomycin HCl 1 gm/ Sodium (Chloride)  250 mls @ 166.667 mls/hr IVPB Q12 [facility]; 

Protocol


   Last Admin: 10/23/18 21:46 Dose:  166.667 mls/hr


Levothyroxine Sodium (Synthroid)  75 mcg PO DAILY@0630 [facility]


   Last Admin: 10/24/18 07:09 Dose:  75 mcg


Losartan Potassium (Cozaar)  25 mg PO DAILY [facility]


   Last Admin: 10/24/18 09:12 Dose:  25 mg


Megestrol Acetate (Megace)  20 mg PO DAILY [facility]


   Last Admin: 10/24/18 09:13 Dose:  20 mg


Mirtazapine (Remeron)  30 mg PO HS [facility]


   Last Admin: 10/23/18 21:46 Dose:  30 mg


Pantoprazole Sodium (Protonix Ec Tab)  20 mg PO DAILY [facility]


   Last Admin: 10/24/18 09:14 Dose:  20 mg


Prednisone (Prednisone Tab)  5 mg PO BID [facility]


   Last Admin: 10/24/18 09:14 Dose:  5 mg


Ranolazine (Ranexa)  500 mg PO Q12 [facility]


   Last Admin: 10/24/18 09:14 Dose:  500 mg


Fluticasone/Salmeterol (Advair Diskus 500/50)  1 puff IH Q12 [facility]


   Last Admin: 10/24/18 09:10 Dose:  1 puff


Spironolactone (Aldactone)  25 mg PO BID [facility]


   Last Admin: 10/24/18 09:10 Dose:  25 mg











- Labs


Labs: 


                                        





                                 10/24/18 05:35 





                                 10/24/18 05:35 











- Constitutional


Appears: Non-toxic, No Acute Distress





- Head Exam


Head Exam: ATRAUMATIC, NORMAL INSPECTION, NORMOCEPHALIC





- Eye Exam


Eye Exam: EOMI, Normal appearance, PERRL





- ENT Exam


ENT Exam: Mucous Membranes Moist, Normal Exam





- Neck Exam


Neck Exam: Full ROM, Normal Inspection





- Respiratory Exam


Respiratory Exam: Clear to Ausculation Bilateral, NORMAL BREATHING PATTERN





- Cardiovascular Exam


Cardiovascular Exam: RRR, +S1, +S2





- GI/Abdominal Exam


GI & Abdominal Exam: Soft, Tenderness, Normal Bowel Sounds





- Extremities Exam


Extremities Exam: Full ROM, Normal Inspection





- Neurological Exam


Neurological Exam: Alert, Awake, Oriented x3





- Psychiatric Exam


Psychiatric exam: Normal Affect, Normal Mood





- Skin


Skin Exam: Dry, Intact, Normal Color, Warm





Assessment and Plan





- Assessment and Plan (Free Text)


Assessment: 


1. Colitis


2. Diarrhea-resolved


3. SOB 








Plan: 


-Continue supportive care 


-Pt denies any abdominal pain and diarrhea


-Pt tolerating bland soft diet 


-IV abx for colitis, okay to change to po abx prior to discharge 


-Labs wnl 


-Order stool studies including cdiff to r/o infectious etiology


-From GI perspective, pt okay for discharge ome on po abx and bowel regimen 


-Pt will need outpt followup with GI once discharged 


-Please call with any questions or concerns

## 2018-10-24 NOTE — CP.PCM.PN
Subjective





- Date & Time of Evaluation


Date of Evaluation: 10/24/18


Time of Evaluation: 09:38





- Subjective


Subjective: 





pt doing well. ethan regular diet. no f/c, n/v/d. no compalints. bw noted. pending

lactate





Objective





- Vital Signs/Intake and Output


Vital Signs (last 24 hours): 


                                        











Temp Pulse Resp BP Pulse Ox


 


 97.3 F L  67   18   142/66   100 


 


 10/24/18 08:48  10/24/18 09:12  10/24/18 08:48  10/24/18 09:13  10/24/18 08:48











- Medications


Medications: 


                               Current Medications





Atorvastatin Calcium (Lipitor)  10 mg PO HS [facility]


   Last Admin: 10/23/18 21:45 Dose:  10 mg


Carvedilol (Coreg)  3.125 mg PO Q12 [facility]


   Last Admin: 10/24/18 09:11 Dose:  3.125 mg


Clopidogrel Bisulfate (Plavix)  75 mg PO DAILY [facility]


   Last Admin: 10/24/18 09:14 Dose:  75 mg


Cyanocobalamin (Vitamin B12 1000 Mcg Tab)  1,000 mcg PO DAILY [facility]


   Last Admin: 10/24/18 09:16 Dose:  1,000 mcg


Furosemide (Lasix)  40 mg PO DAILY [facility]


   Last Admin: 10/24/18 09:13 Dose:  40 mg


Gabapentin (Neurontin)  100 mg PO Q8 [facility]


   Last Admin: 10/24/18 09:13 Dose:  100 mg


Home Med (Icosapent Ethyl [Vascepa])  1 gm PO Q12H [facility]


Home Med (Vorapaxar Sulfate [Zontivity])  2.08 mg PO DAILY [facility]


Metronidazole (Flagyl 500mg/100ml Ns)  100 mls @ 100 mls/hr IVPB Q8 [facility]; 

Protocol


   Last Admin: 10/24/18 01:03 Dose:  100 mls/hr


Vancomycin HCl 1 gm/ Sodium (Chloride)  250 mls @ 166.667 mls/hr IVPB Q12 [facility]; 

Protocol


   Last Admin: 10/23/18 21:46 Dose:  166.667 mls/hr


Levothyroxine Sodium (Synthroid)  75 mcg PO DAILY@0630 [facility]


   Last Admin: 10/24/18 07:09 Dose:  75 mcg


Losartan Potassium (Cozaar)  25 mg PO DAILY [facility]


   Last Admin: 10/24/18 09:12 Dose:  25 mg


Megestrol Acetate (Megace)  20 mg PO DAILY [facility]


   Last Admin: 10/24/18 09:13 Dose:  20 mg


Mirtazapine (Remeron)  30 mg PO HS [facility]


   Last Admin: 10/23/18 21:46 Dose:  30 mg


Pantoprazole Sodium (Protonix Ec Tab)  20 mg PO DAILY [facility]


   Last Admin: 10/24/18 09:14 Dose:  20 mg


Prednisone (Prednisone Tab)  5 mg PO BID [facility]


   Last Admin: 10/24/18 09:14 Dose:  5 mg


Ranolazine (Ranexa)  500 mg PO Q12 [facility]


   Last Admin: 10/24/18 09:14 Dose:  500 mg


Fluticasone/Salmeterol (Advair Diskus 500/50)  1 puff IH Q12 [facility]


   Last Admin: 10/24/18 09:10 Dose:  1 puff


Spironolactone (Aldactone)  25 mg PO BID [facility]


   Last Admin: 10/24/18 09:10 Dose:  25 mg











- Labs


Labs: 


                                        





                                 10/24/18 05:35 





                                 10/24/18 05:35 











- Constitutional


Appears: Well, Non-toxic, No Acute Distress





- Head Exam


Head Exam: ATRAUMATIC, NORMAL INSPECTION, NORMOCEPHALIC





- Eye Exam


Eye Exam: EOMI, Normal appearance, PERRL


Pupil Exam: NORMAL ACCOMODATION, PERRL





- ENT Exam


ENT Exam: Mucous Membranes Moist, Normal Exam





- Neck Exam


Neck Exam: Full ROM, Normal Inspection.  absent: Lymphadenopathy





- Respiratory Exam


Respiratory Exam: Clear to Ausculation Bilateral, NORMAL BREATHING PATTERN





- Cardiovascular Exam


Cardiovascular Exam: REGULAR RHYTHM, RRR, +S1, +S2.  absent: Murmur





- GI/Abdominal Exam


GI & Abdominal Exam: Soft, Normal Bowel Sounds.  absent: Tenderness





- Extremities Exam


Extremities Exam: Full ROM, Normal Capillary Refill, Normal Inspection.  absent:

 Joint Swelling, Pedal Edema





- Back Exam


Back Exam: NORMAL INSPECTION





- Neurological Exam


Neurological Exam: Alert, Awake, CN II-XII Intact, Normal Gait, Oriented x3





- Psychiatric Exam


Psychiatric exam: Normal Affect, Normal Mood





- Skin


Skin Exam: Dry, Intact, Normal Color, Warm





Assessment and Plan


(1) Colitis


Assessment & Plan: 


cont nabx


f/u lactate


cdff


gi


tolerating po


Status: Acute   





(2) DVT prophylaxis


Assessment & Plan: 


scd nad ae hose


plavix, ambulation


Status: Acute

## 2018-10-25 VITALS
SYSTOLIC BLOOD PRESSURE: 150 MMHG | RESPIRATION RATE: 18 BRPM | HEART RATE: 70 BPM | TEMPERATURE: 97.7 F | DIASTOLIC BLOOD PRESSURE: 76 MMHG

## 2018-10-25 LAB
BASOPHILS # BLD AUTO: 0 K/UL (ref 0–0.2)
BASOPHILS NFR BLD: 0.3 % (ref 0–2)
BUN SERPL-MCNC: 22 MG/DL (ref 7–17)
CALCIUM SERPL-MCNC: 8.8 MG/DL (ref 8.4–10.2)
EOSINOPHIL # BLD AUTO: 0 K/UL (ref 0–0.7)
EOSINOPHIL NFR BLD: 0.4 % (ref 0–4)
ERYTHROCYTE [DISTWIDTH] IN BLOOD BY AUTOMATED COUNT: 26.4 % (ref 11.5–14.5)
GFR NON-AFRICAN AMERICAN: > 60
HGB BLD-MCNC: 12.3 G/DL (ref 12–16)
LYMPHOCYTES # BLD AUTO: 1.5 K/UL (ref 1–4.3)
LYMPHOCYTES NFR BLD AUTO: 17.8 % (ref 20–40)
MCH RBC QN AUTO: 29.2 PG (ref 27–31)
MCHC RBC AUTO-ENTMCNC: 33.1 G/DL (ref 33–37)
MCV RBC AUTO: 88.3 FL (ref 81–99)
MONOCYTES # BLD: 0.7 K/UL (ref 0–0.8)
MONOCYTES NFR BLD: 8.2 % (ref 0–10)
NEUTROPHILS # BLD: 6.3 K/UL (ref 1.8–7)
NEUTROPHILS NFR BLD AUTO: 73.3 % (ref 50–75)
NRBC BLD AUTO-RTO: 0.1 % (ref 0–0)
PLATELET # BLD: 239 K/UL (ref 130–400)
PMV BLD AUTO: 9.9 FL (ref 7.2–11.7)
RBC # BLD AUTO: 4.22 MIL/UL (ref 3.8–5.2)
WBC # BLD AUTO: 8.6 K/UL (ref 4.8–10.8)

## 2018-10-25 RX ADMIN — RANOLAZINE SCH MG: 500 TABLET, FILM COATED, EXTENDED RELEASE ORAL at 09:41

## 2018-10-25 RX ADMIN — PANTOPRAZOLE SODIUM SCH MG: 20 TABLET, DELAYED RELEASE ORAL at 09:41

## 2018-10-25 RX ADMIN — FLUTICASONE PROPIONATE AND SALMETEROL SCH PUFF: 50; 500 POWDER RESPIRATORY (INHALATION) at 09:42

## 2018-10-25 RX ADMIN — METRONIDAZOLE SCH MLS/HR: 500 INJECTION, SOLUTION INTRAVENOUS at 01:22

## 2018-10-25 NOTE — PQF
PROVIDER RESPONSE TEXT:



unknown



REVIEWER QUERY TEXT:



Documentation Clarification



Your help is requested in clarifying the following clinical documentation, if you can please further 
specify in the medical record and discharge summary.



Please further clarify the type of colitis after workup if known:



Such as: Allergic, Clostridium Difficile, Drug Induced, Eosinophilic, Infectious, Ischemic, Non-Infec
tious, Toxic, Ulcerative, Other , Unable to determine



The patient's Clinical Indicators include:

Admitted with SOB, wheezing, abdominal pain and diarrhea.



WBC 20.9 L shift, afebrile, lactate 2.5



CT Abdomen: Findings suspicious for nonspecific colitis and gastritis. Possible  infectious etiology.
 Fluid distended distal thoracic esophagus. Nonspecific. Fluid adjacent to gallbladder and in Morriso
n's pouch,  nonspecific. Status post descending/sigmoid colonic anastomosis.



Rx: IVAB









Query created by: Shae Shrestha on 10/25/2018 8:56 AM





Electronically signed by:  Ramon DE LA ROSA 10/25/2018 9:04 AM

## 2018-10-25 NOTE — CP.PCM.DIS
Provider





- Provider


Date of Admission: 


10/22/18 18:43





Attending physician: 


Marco Antonio Jensen MD





Time Spent in preparation of Discharge (in minutes): 15





Diagnosis





- Discharge Diagnosis


(1) Colitis


Status: Acute   





(2) DVT prophylaxis


Status: Acute   Priority: Low   





Hospital Course





- Lab Results


Lab Results: 


                                  Micro Results





10/22/18 16:45   Blood   Blood Culture - Preliminary


                            NO GROWTH AFTER 48 HOURS





                             Most Recent Lab Values











WBC  8.6 K/uL (4.8-10.8)   10/25/18  05:16    


 


RBC  4.22 Mil/uL (3.80-5.20)   10/25/18  05:16    


 


Hgb  12.3 g/dL (12.0-16.0)   10/25/18  05:16    


 


Hct  37.3 % (34.0-47.0)   10/25/18  05:16    


 


MCV  88.3 fl (81.0-99.0)   10/25/18  05:16    


 


MCH  29.2 pg (27.0-31.0)   10/25/18  05:16    


 


MCHC  33.1 g/dL (33.0-37.0)   10/25/18  05:16    


 


RDW  26.4 % (11.5-14.5)  H  10/25/18  05:16    


 


Plt Count  239 K/uL (130-400)   10/25/18  05:16    


 


MPV  9.9 fl (7.2-11.7)   10/25/18  05:16    


 


Neut % (Auto)  73.3 % (50.0-75.0)   10/25/18  05:16    


 


Lymph % (Auto)  17.8 % (20.0-40.0)  L  10/25/18  05:16    


 


Mono % (Auto)  8.2 % (0.0-10.0)   10/25/18  05:16    


 


Eos % (Auto)  0.4 % (0.0-4.0)   10/25/18  05:16    


 


Baso % (Auto)  0.3 % (0.0-2.0)   10/25/18  05:16    


 


Neut # (Auto)  6.3 K/uL (1.8-7.0)   10/25/18  05:16    


 


Lymph # (Auto)  1.5 K/uL (1.0-4.3)   10/25/18  05:16    


 


Mono # (Auto)  0.7 K/uL (0.0-0.8)   10/25/18  05:16    


 


Eos # (Auto)  0.0 K/uL (0.0-0.7)   10/25/18  05:16    


 


Baso # (Auto)  0.0 K/uL (0.0-0.2)   10/25/18  05:16    


 


pO2  34 mm/Hg (30-55)   10/22/18  16:51    


 


VBG pH  7.41  (7.32-7.43)   10/22/18  16:51    


 


VBG pCO2  36 mmHg (40-60)  L  10/22/18  16:51    


 


VBG HCO3  23.0 mmol/L  10/22/18  16:51    


 


VBG Total CO2  23.9 mmol/L (22-28)   10/22/18  16:51    


 


VBG O2 Sat (Calc)  66.6 % (40-65)  H  10/22/18  16:51    


 


VBG Base Excess  -1.4 mmol/L (0.0-2.0)  L  10/22/18  16:51    


 


VBG Potassium  4.8 mmol/L (3.6-5.2)   10/22/18  16:51    


 


Sodium  137.0 mmol/L (132-148)   10/22/18  16:51    


 


Chloride  106.0 mmol/L ()   10/22/18  16:51    


 


Glucose  118 mg/dL ()  H  10/22/18  16:51    


 


Lactate  2.5 mmol/L (0.7-2.1)  H  10/22/18  16:51    


 


FiO2  28.0 %  10/22/18  16:51    


 


Sodium  139 mmol/l (132-148)   10/25/18  05:16    


 


Potassium  4.0 MMOL/L (3.6-5.0)   10/25/18  05:16    


 


Chloride  109 mmol/L ()  H  10/25/18  05:16    


 


Carbon Dioxide  22 mmol/L (22-30)   10/25/18  05:16    


 


Anion Gap  12  (10-20)   10/25/18  05:16    


 


BUN  22 mg/dl (7-17)  H  10/25/18  05:16    


 


Creatinine  0.5 mg/dl (0.7-1.2)  L  10/25/18  05:16    


 


Est GFR ( Amer)  > 60   10/25/18  05:16    


 


Est GFR (Non-Af Amer)  > 60   10/25/18  05:16    


 


POC Glucose (mg/dL)  85 mg/dL ()   10/25/18  05:19    


 


Random Glucose  97 mg/dL ()   10/25/18  05:16    


 


Lactic Acid  2.0 MMOL/L (0.7-2.1)   10/24/18  17:00    


 


Calcium  8.8 mg/dL (8.4-10.2)   10/25/18  05:16    


 


Total Bilirubin  0.4 mg/dl (0.2-1.3)   10/23/18  04:25    


 


AST  18 U/L (14-36)   10/23/18  04:25    


 


ALT  26 U/L (9-52)   10/23/18  04:25    


 


Alkaline Phosphatase  43 U/L ()   10/23/18  04:25    


 


Total Protein  6.2 G/DL (6.3-8.2)  L  10/23/18  04:25    


 


Albumin  3.2 g/dL (3.5-5.0)  L D 10/23/18  04:25    


 


Globulin  3.0 gm/dL (2.2-3.9)   10/23/18  04:25    


 


Albumin/Globulin Ratio  1.1  (1.0-2.1)   10/23/18  04:25    


 


Venous Blood Potassium  4.8 mmol/L (3.6-5.2)   10/22/18  16:51    














- Hospital Course


Hospital Course: 





iv anbx, gi, monitor bw, ivf





Discharge Exam





- Head Exam


Head Exam: ATRAUMATIC, NORMAL INSPECTION, NORMOCEPHALIC





- Eye Exam


Eye Exam: EOMI, Normal appearance, PERRL


Pupil Exam: NORMAL ACCOMODATION, PERRL





- Respiratory Exam


Respiratory Exam: Clear to PA & Lateral, NORMAL BREATHING PATTERN, UNREMARKABLE





- Cardiovascular Exam


Cardiovascular Exam: REGULAR RHYTHM, RRR, +S1, +S2





- GI/Abdominal Exam


GI & Abdominal Exam: Normal Bowel Sounds, Soft, Unremarkable





- Extremities Exam


Extremities exam: full ROM, normal capillary refill, normal inspection, pedal 

pulses present





- Neurological Exam


Neurological exam: Alert, CN II-XII Intact, Normal Gait, Oriented x3, Reflexes 

Normal





- Psychiatric Exam


Psychiatric exam: Normal Affect, Normal Mood





- Skin


Skin Exam: Dry, Intact, Normal Color, Warm





Discharge Plan





- Discharge Medications


Prescriptions: 


Lactobacillus Acidophilus [Bacid Acidophilus] 1 cap PO BID #14 cap


Metronidazole [Flagyl] 500 mg PO Q8 #9 tab





- Follow Up Plan


Condition: GUARDED


Disposition: HOME/ ROUTINE


Additional Instructions: 


final dx-colitis


no f/c n/v/d. doing well. ethan po. gi cleared for dc

## 2018-10-25 NOTE — CP.PCM.PN
Subjective





- Date & Time of Evaluation


Date of Evaluation: 10/25/18


Time of Evaluation: 09:00





- Subjective


Subjective: 


GI Fellow PGY5 Progress Note


Pt seen and evaluated at bedside, doing well with no complaints. Tolerating diet

and no diarrhea. + BM. 


ROS: A 12pt ROS was negative except as above. 











Objective





- Vital Signs/Intake and Output


Vital Signs (last 24 hours): 


                                        











Temp Pulse Resp BP Pulse Ox


 


 97.7 F   70   18   150/76   100 


 


 10/25/18 08:00  10/25/18 08:00  10/25/18 08:00  10/25/18 08:00  10/25/18 08:00








Intake and Output: 


                                        











 10/25/18 10/25/18





 06:59 18:59


 


Intake Total  1450


 


Balance  1450














- Medications


Medications: 


                               Current Medications





Atorvastatin Calcium (Lipitor)  10 mg PO HS [facility]


   Last Admin: 10/24/18 22:34 Dose:  Not Given


Carvedilol (Coreg)  3.125 mg PO Q12 [facility]


   Last Admin: 10/24/18 20:35 Dose:  3.125 mg


Clopidogrel Bisulfate (Plavix)  75 mg PO DAILY [facility]


   Last Admin: 10/24/18 09:14 Dose:  75 mg


Cyanocobalamin (Vitamin B12 1000 Mcg Tab)  1,000 mcg PO DAILY [facility]


   Last Admin: 10/24/18 09:16 Dose:  1,000 mcg


Furosemide (Lasix)  40 mg PO DAILY [facility]


   Last Admin: 10/24/18 09:13 Dose:  40 mg


Gabapentin (Neurontin)  100 mg PO Q8 [facility]


   Last Admin: 10/25/18 01:22 Dose:  Not Given


Home Med (Vorapaxar Sulfate [Zontivity])  2.08 mg PO DAILY [facility]


Metronidazole (Flagyl 500mg/100ml Ns)  100 mls @ 100 mls/hr IVPB Q8 [facility]; 

Protocol


   Last Admin: 10/25/18 01:22 Dose:  100 mls/hr


Vancomycin HCl 1 gm/ Sodium (Chloride)  250 mls @ 166.667 mls/hr IVPB Q12 [facility]; 

Protocol


   Last Admin: 10/24/18 20:24 Dose:  166.667 mls/hr


Sodium Chloride (Sodium Chloride 0.9%)  500 mls @ 100 mls/hr IV .Q5H [facility]


   Stop: 10/25/18 10:49


   Last Admin: 10/25/18 07:01 Dose:  100 mls/hr


Levothyroxine Sodium (Synthroid)  75 mcg PO DAILY@0630 [facility]


   Last Admin: 10/25/18 06:29 Dose:  75 mcg


Losartan Potassium (Cozaar)  25 mg PO DAILY [facility]


   Last Admin: 10/24/18 09:12 Dose:  25 mg


Megestrol Acetate (Megace)  20 mg PO DAILY [facility]


   Last Admin: 10/24/18 09:13 Dose:  20 mg


Mirtazapine (Remeron)  30 mg PO HS [facility]


   Last Admin: 10/24/18 22:33 Dose:  30 mg


Omega-3-Acid Ethyl Esters (Lovaza)  1 gm PO Q12H [facility]


Pantoprazole Sodium (Protonix Ec Tab)  20 mg PO DAILY [facility]


   Last Admin: 10/24/18 09:14 Dose:  20 mg


Prednisone (Prednisone Tab)  5 mg PO BID [facility]


   Last Admin: 10/24/18 17:27 Dose:  5 mg


Ranolazine (Ranexa)  500 mg PO Q12 [facility]


   Last Admin: 10/24/18 20:27 Dose:  500 mg


Fluticasone/Salmeterol (Advair Diskus 500/50)  1 puff IH Q12 [facility]


   Last Admin: 10/24/18 20:26 Dose:  1 puff


Spironolactone (Aldactone)  25 mg PO BID [facility]


   Last Admin: 10/24/18 17:27 Dose:  25 mg











- Labs


Labs: 


                                        





                                 10/25/18 05:16 





                                 10/25/18 05:16 











- Constitutional


Appears: Non-toxic, No Acute Distress





- Head Exam


Head Exam: ATRAUMATIC, NORMAL INSPECTION, NORMOCEPHALIC





- Eye Exam


Eye Exam: EOMI, Normal appearance, PERRL





- ENT Exam


ENT Exam: Mucous Membranes Moist





- Neck Exam


Neck Exam: Full ROM, Normal Inspection





- Respiratory Exam


Respiratory Exam: Clear to Ausculation Bilateral, NORMAL BREATHING PATTERN





- Cardiovascular Exam


Cardiovascular Exam: REGULAR RHYTHM, RRR, +S1, +S2





- GI/Abdominal Exam


GI & Abdominal Exam: Soft, Normal Bowel Sounds





- Extremities Exam


Extremities Exam: Full ROM, Normal Inspection





- Neurological Exam


Neurological Exam: Alert, Awake, Oriented x3





- Psychiatric Exam


Psychiatric exam: Normal Affect, Normal Mood





- Skin


Skin Exam: Dry, Intact, Normal Color, Warm





Assessment and Plan





- Assessment and Plan (Free Text)


Assessment: 


1. Colitis


2. Diarrhea-resolved


3. SOB 














Plan: 


-Continue supportive care 


-Pt denies any abdominal pain and diarrhea


-Pt tolerating bland soft diet 


-Okay to change to po abx prior to discharge 


-Labs wnl 


-From GI perspective, pt okay for discharge home on po abx and bowel regimen 


-Pt will need outpt followup with GI once discharged 


-Please call with any questions or concerns

## 2019-03-29 ENCOUNTER — HOSPITAL ENCOUNTER (INPATIENT)
Dept: HOSPITAL 14 - H.ER | Age: 84
LOS: 4 days | Discharge: HOME | DRG: 605 | End: 2019-04-02
Attending: FAMILY MEDICINE | Admitting: FAMILY MEDICINE
Payer: MEDICARE

## 2019-03-29 VITALS — BODY MASS INDEX: 19.1 KG/M2
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
## 2019-03-29 DIAGNOSIS — F41.9: ICD-10-CM

## 2019-03-29 DIAGNOSIS — Z88.0: ICD-10-CM

## 2019-03-29 DIAGNOSIS — I25.10: ICD-10-CM

## 2019-03-29 DIAGNOSIS — R26.9: ICD-10-CM

## 2019-03-29 DIAGNOSIS — S93.402A: ICD-10-CM

## 2019-03-29 DIAGNOSIS — M17.12: ICD-10-CM
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
## 2019-03-29 DIAGNOSIS — E78.00: ICD-10-CM

## 2019-03-29 DIAGNOSIS — M11.20: ICD-10-CM

## 2019-03-29 DIAGNOSIS — M85.80: ICD-10-CM

## 2019-03-29 DIAGNOSIS — M81.0: ICD-10-CM

## 2019-03-29 DIAGNOSIS — Z79.02: ICD-10-CM

## 2019-03-29 DIAGNOSIS — I50.9: ICD-10-CM

## 2019-03-29 DIAGNOSIS — M19.072: ICD-10-CM

## 2019-03-29 DIAGNOSIS — E78.5: ICD-10-CM

## 2019-03-29 DIAGNOSIS — Z86.73: ICD-10-CM

## 2019-03-29 DIAGNOSIS — Z79.890: ICD-10-CM

## 2019-03-29 DIAGNOSIS — M16.12: ICD-10-CM

## 2019-03-29 DIAGNOSIS — J45.20: ICD-10-CM

## 2019-03-29 DIAGNOSIS — E03.9: ICD-10-CM

## 2019-03-29 DIAGNOSIS — I11.0: ICD-10-CM
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
## 2019-03-29 DIAGNOSIS — S70.02XA: Primary | ICD-10-CM

## 2019-03-29 LAB
BASOPHILS # BLD AUTO: 0.1 K/UL (ref 0–0.2)
BASOPHILS NFR BLD: 0.8 % (ref 0–2)
BUN SERPL-MCNC: 20 MG/DL (ref 7–17)
CALCIUM SERPL-MCNC: 9.4 MG/DL (ref 8.4–10.2)
EOSINOPHIL # BLD AUTO: 0.4 K/UL (ref 0–0.7)
EOSINOPHIL NFR BLD: 4.9 % (ref 0–4)
ERYTHROCYTE [DISTWIDTH] IN BLOOD BY AUTOMATED COUNT: 13.3 % (ref 11.5–14.5)
GFR NON-AFRICAN AMERICAN: > 60
HGB BLD-MCNC: 14.5 G/DL (ref 12–16)
LYMPHOCYTES # BLD AUTO: 1.5 K/UL (ref 1–4.3)
LYMPHOCYTES NFR BLD AUTO: 15.8 % (ref 20–40)
MCH RBC QN AUTO: 34.6 PG (ref 27–31)
MCHC RBC AUTO-ENTMCNC: 34.1 G/DL (ref 33–37)
MCV RBC AUTO: 101.5 FL (ref 81–99)
MONOCYTES # BLD: 0.9 K/UL (ref 0–0.8)
MONOCYTES NFR BLD: 9.3 % (ref 0–10)
NEUTROPHILS # BLD: 6.4 K/UL (ref 1.8–7)
NEUTROPHILS NFR BLD AUTO: 69.2 % (ref 50–75)
NRBC BLD AUTO-RTO: 0.1 % (ref 0–0)
PLATELET # BLD: 291 K/UL (ref 130–400)
PMV BLD AUTO: 9.8 FL (ref 7.2–11.7)
RBC # BLD AUTO: 4.17 MIL/UL (ref 3.8–5.2)
WBC # BLD AUTO: 9.2 K/UL (ref 4.8–10.8)

## 2019-03-29 RX ADMIN — RANOLAZINE SCH MG: 500 TABLET, FILM COATED, EXTENDED RELEASE ORAL at 22:36

## 2019-03-29 RX ADMIN — OMEGA-3-ACID ETHYL ESTERS SCH: 900 CAPSULE, LIQUID FILLED ORAL at 22:35

## 2019-03-29 RX ADMIN — OMEGA-3-ACID ETHYL ESTERS SCH GM: 900 CAPSULE, LIQUID FILLED ORAL at 22:22

## 2019-03-29 RX ADMIN — FLUTICASONE PROPIONATE AND SALMETEROL SCH PUFF: 50; 500 POWDER RESPIRATORY (INHALATION) at 22:23

## 2019-03-29 NOTE — RAD
PROCEDURE:  Left Hip X-ray Radiographs.



HISTORY:

 hip pain s/p fall 



COMPARISON:

None.



TECHNIQUE:

3 views obtained.



FINDINGS:



BONES:

The pelvic ring is intact.  There is diffuse bone demineralization.  

There is no acute displaced fracture or bone destruction.



JOINTS:

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 

sacroiliac joints. 



SOFT TISSUES:

Normal. 



OTHER FINDINGS:

There atherosclerotic vascular calcification.  There is a 

endovascular stent graft in the right external iliac artery.



IMPRESSION:

No acute displaced fracture or dislocation. Please note occult 

fractures cannot be excluded on plain radiographs.  If there is a 

persistent clinical concern, an MRI of the hip may be performed for 

further evaluation.

## 2019-03-29 NOTE — ED PDOC
Lower Extremity Pain/Injury


Time Seen by Provider: 03/29/19 12:20


Chief Complaint (Nursing): Lower Extremity Problem/Injury


Chief Complaint (Provider): Left hip pain


History Per: Patient


History/Exam Limitations: no limitations


Onset/Duration Of Symptoms: Days (x1)


Current Symptoms Are (Timing): Still Present


Additional Complaint(s): 





87 y/o female presents to the ED with left hip pain.  Patient states around 

22:30, patient was walking through her apartment when she slipped and fell on 

her left side.  She was able to get up and walk but pain became more severe toda

y prompting ED visit.  She denies head injury, dizziness, or other injury.





PMD: Dr. Ramon Kapoor





Past Medical History


Reviewed: Historical Data, Nursing Documentation, Vital Signs


Vital Signs: 





                                Last Vital Signs











Temp  98.3 F   03/29/19 11:17


 


Pulse  85   03/29/19 11:17


 


Resp  18   03/29/19 11:17


 


BP  123/67   03/29/19 11:17


 


Pulse Ox  100   03/29/19 11:17














- Medical History


PMH: Anxiety, Arthritis, Asthma, Bronchitis, CAD, CHF, CVA, HTN, 

Hypercholesterolemia, Hyperlipidemia, Hypothyroidism, Osteoporosis


   Denies: COPD, HIV, Chronic Kidney Disease, Rheumatoid Arthritis





- Surgical History


Surgical History: Appendectomy


   Denies: Pacemaker





- Family History


Family History: States: Unknown Family Hx





- Immunization History


Hx Tetanus Toxoid Vaccination: No


Hx Influenza Vaccination: No


Hx Pneumococcal Vaccination: No





- Home Medications


Home Medications: 


                                Ambulatory Orders











 Medication  Instructions  Recorded


 


Carvedilol [Coreg] 3.125 mg PO Q12H 07/27/17


 


Furosemide [Lasix] 40 mg PO DAILY 07/27/17


 


Gabapentin [Neurontin] 100 mg PO Q8 07/27/17


 


Levothyroxine [Synthroid] 75 mcg PO DAILY 07/27/17


 


Ranolazine [Ranexa] 500 mg PO Q12H 07/27/17


 


Simvastatin 20 mg PO HS 07/27/17


 


Spironolactone [Aldactone] 25 mg PO BID 07/27/17


 


Icosapent Ethyl [Vascepa] 1 gm PO Q12H 12/12/17


 


Vorapaxar Sulfate [Zontivity] 2.08 mg PO DAILY 12/12/17


 


Clopidogrel [Plavix] 75 mg PO DAILY 10/22/18


 


Cyanocobalamin [Vitamin B12 1000 1,000 mcg PO DAILY 10/22/18





mcg Tab]  


 


Fluticasone/Salmeterol 500/50 1 puff IH Q12 10/22/18





[Advair Diskus 500/50]  


 


Losartan [Cozaar] 25 mg PO DAILY 10/22/18


 


Megestrol [Megace] 20 mg PO DAILY 10/22/18


 


Mirtazapine [Remeron] 30 mg PO HS 10/22/18


 


Omeprazole 20 mg PO DAILY 10/22/18


 


predniSONE [predniSONE Tab] 5 mg PO BID 10/22/18


 


Lactobacillus Acidophilus [Bacid 1 cap PO BID #14 cap 10/25/18





Acidophilus]  


 


Metronidazole [Flagyl] 500 mg PO Q8 #9 tab 10/25/18














- Allergies


Allergies/Adverse Reactions: 


                                    Allergies











Allergy/AdvReac Type Severity Reaction Status Date / Time


 


Penicillins Allergy Severe RASH Verified 03/29/19 12:04














Review of Systems


ROS Statement: Except As Marked, All Systems Reviewed And Found Negative


Musculoskeletal: Positive for: Other (Left hip pain; no head injury)


Neurological: Negative for: Dizziness





Physical Exam





- Reviewed


Nursing Documentation Reviewed: Yes


Vital Signs Reviewed: Yes





- Physical Exam


Appears: Positive for: No Acute Distress


Head Exam: Positive for: ATRAUMATIC, NORMOCEPHALIC


Skin: Positive for: Normal Color, Warm, Dry


Eye Exam: Positive for: Normal appearance


Neck: Positive for: Normal, Painless ROM


Cardiovascular/Chest: Positive for: Regular Rate, Rhythm


Respiratory: Positive for: Normal Breath Sounds.  Negative for: Wheezing, 

Respiratory Distress


Extremity: Positive for: Tenderness (tenderness to palpation of left hip).  

Negative for: Deformity (visible deformity), Other (shortening of leg or 

rotation)


Neurological/Psych: Positive for: Awake, Alert, Normal Tone





- ECG


O2 Sat by Pulse Oximetry: 100 (RA)


Pulse Ox Interpretation: Normal





Medical Decision Making


Medical Decision Making: 





Initial Impression: Workup for hep/pelvis injury





Initial Plan: 


--Left hip X-ray for left hip injury


--Tylenol 650mg PO for pain


--Reassess patient





13:14


Left hip X-ray


FINDINGS:





BONES:


The pelvic ring is intact.  There is diffuse bone demineralization.  There is no

 acute displaced fracture or bone destruction.





JOINTS:


There is mild degenerative osteoarthrosis in the hip joints and sacroiliac 

joints. 





SOFT TISSUES:


Normal. 





OTHER FINDINGS:


There atherosclerotic vascular calcification.  There is a endovascular stent 

graft in the right external iliac artery.





IMPRESSION:


No acute displaced fracture or dislocation. Please note occult fractures cannot 

be excluded on plain radiographs.  If there is a persistent clinical concern, an

 MRI of the hip may be performed for further evaluation.





13:58


No fractures seen on X-ray.  Discussed possibility of occult fracture and need 

for MRI if symptoms not improving.  Tylenol for pain. 





14:11


On reevaluation, patient was able to stand but with excruciating pain.  Will get

 MRI for pain.  Patient to be admitted to observation under 

Shayna/Mikey/Antwon.


------------------------------------------------------

-------------------------------------------


Scribe Attestation:


Documented by Victor M Ratliff acting as a scribe for Monse Charles MD.





Provider Scribe Attestation:


All medical record entries made by the Scribe were at my direction and 

personally dictated by me. I have reviewed the chart and agree that the record 

accurately reflects my personal performance of the history, physical exam, 

medical decision making, and the department course for this patient. I have also

 personally directed, reviewed, and agree with the discharge instructions and 

disposition.





Disposition





- Clinical Impression


Clinical Impression: 


 Hip injury








- Disposition


Referrals: 


Ramon Kapoor, ANTONIA, APN [Family Provider] - 


Disposition: Routine/Home


Disposition Time: 13:58


Condition: IMPROVED


Additional Instructions: 


Follow up with primary medical doctor in one week. Take Tylenol for pain.  

Return to the emergency department if symptoms are not improving in 48 hours.


Forms:  Krikle (Amharic)


Print Language: Upper sorbian

## 2019-03-30 RX ADMIN — OXYCODONE HYDROCHLORIDE AND ACETAMINOPHEN PRN TAB: 5; 325 TABLET ORAL at 19:12

## 2019-03-30 RX ADMIN — OXYCODONE HYDROCHLORIDE AND ACETAMINOPHEN PRN TAB: 5; 325 TABLET ORAL at 06:55

## 2019-03-30 RX ADMIN — OMEGA-3-ACID ETHYL ESTERS SCH GM: 900 CAPSULE, LIQUID FILLED ORAL at 10:30

## 2019-03-30 RX ADMIN — OMEGA-3-ACID ETHYL ESTERS SCH: 900 CAPSULE, LIQUID FILLED ORAL at 22:04

## 2019-03-30 RX ADMIN — RANOLAZINE SCH MG: 500 TABLET, FILM COATED, EXTENDED RELEASE ORAL at 10:32

## 2019-03-30 RX ADMIN — FLUTICASONE PROPIONATE AND SALMETEROL SCH PUFF: 50; 500 POWDER RESPIRATORY (INHALATION) at 09:15

## 2019-03-30 RX ADMIN — RANOLAZINE SCH MG: 500 TABLET, FILM COATED, EXTENDED RELEASE ORAL at 22:03

## 2019-03-30 RX ADMIN — PANTOPRAZOLE SODIUM SCH MG: 20 TABLET, DELAYED RELEASE ORAL at 09:39

## 2019-03-30 RX ADMIN — FLUTICASONE PROPIONATE AND SALMETEROL SCH PUFF: 50; 500 POWDER RESPIRATORY (INHALATION) at 22:04

## 2019-03-30 NOTE — RAD
Date of service: 



03/30/2019



PROCEDURE:  Left Knee Radiographs.



HISTORY:

Pain.



COMPARISON:

None.



TECHNIQUE:

2 views obtained.



FINDINGS:



BONES:

No evidence of acute displaced fracture nor dislocation.  Diffuse 

demineralization. 



JOINTS:

Mild tricompartmental degenerative osteoarthritis with apparent 

chondrocalcinosis; rule out CPPD. 



JOINT EFFUSION:

No significant joint effusion 



OTHER FINDINGS:

None.



IMPRESSION:

No evidence of acute displaced fracture nor dislocation.  



Mild DJD osteoarthritis with apparent chondrocalcinosis; rule out CP 

PD



Diffuse demineralization.

## 2019-03-30 NOTE — CP.PCM.HP
History of Present Illness





- History of Present Illness


History of Present Illness: 





pt admitted for lle pain s/p mechanical trip/fall. denies f/c, n/v/d. pain 

controlled at presnt.


mri hip completed and pending report.  pt requesting more HHA hrs.


bw and imaging noted





Present on Admission





- Present on Admission


Any Indicators Present on Admission: No





Review of Systems





- Musculoskeletal


Musculoskeletal: As Per HPI, Arthralgias





Past Patient History





- Past Medical History & Family History


Past Medical History?: Yes





- Past Social History


Smoking Status: Never Smoked





- CARDIAC


Hx Cardiac Disorders: Yes


Hx Congestive Heart Failure: Yes


Hx Hypercholesterolemia: Yes


Hx Hypertension: Yes


Hx Pacemaker: No





- PULMONARY


Hx Respiratory Disorders: Yes


Hx Asthma: Yes


Hx Bronchitis: Yes


Hx Chronic Obstructive Pulmonary Disease (COPD): No





- NEUROLOGICAL


Hx Neurological Disorder: No


Hx Paralysis: No





- HEENT


Hx HEENT Problems: Yes


Hx Cataracts: Yes





- RENAL


Hx Chronic Kidney Disease: No





- ENDOCRINE/METABOLIC


Hx Endocrine Disorders: Yes


Hx Hypothyroidism: Yes





- HEMATOLOGICAL/ONCOLOGICAL


Hx Blood Disorders: No


Hx Human Immunodeficiency Virus (HIV): No





- INTEGUMENTARY


Hx Dermatological Problems: No





- MUSCULOSKELETAL/RHEUMATOLOGICAL


Hx Musculoskeletal Disorders: Yes


Hx Falls: Yes


Hx Osteoporosis: Yes





- GASTROINTESTINAL


Hx Gastrointestinal Disorders: Yes


Hx Bowel Surgery: Yes (Colon Sx due to Colon Ca)





- GENITOURINARY/GYNECOLOGICAL


Hx Genitourinary Disorders: No





- PSYCHIATRIC


Hx Psychophysiologic Disorder: Yes


Hx Anxiety: Yes


Hx Substance Use: No





- SURGICAL HISTORY


Hx Surgeries: Yes


Hx Appendectomy: Yes





- ANESTHESIA


Hx Anesthesia: Yes


Hx Anesthesia Reactions: No


Hx Malignant Hyperthermia: No





Meds


Allergies/Adverse Reactions: 


                                    Allergies











Allergy/AdvReac Type Severity Reaction Status Date / Time


 


Penicillins Allergy Severe RASH Verified 03/29/19 12:04














Physical Exam





- Constitutional


Appears: Well, Non-toxic, No Acute Distress





- Head Exam


Head Exam: ATRAUMATIC, NORMAL INSPECTION, NORMOCEPHALIC





- Eye Exam


Eye Exam: EOMI, Normal appearance, PERRL


Pupil Exam: NORMAL ACCOMODATION, PERRL





- ENT Exam


ENT Exam: Mucous Membranes Moist, Normal Exam





- Neck Exam


Neck exam: Positive for: Normal Inspection





- Respiratory Exam


Respiratory Exam: Clear to Auscultation Bilateral, NORMAL BREATHING PATTERN





- Cardiovascular Exam


Cardiovascular Exam: REGULAR RHYTHM, RRR, +S1, +S2





- GI/Abdominal Exam


GI & Abdominal Exam: Normal Bowel Sounds, Soft.  absent: Tenderness





- Extremities Exam


Extremities exam: Positive for: full ROM, normal capillary refill, normal 

inspection, pedal pulses present





- Back Exam


Back exam: NORMAL INSPECTION





- Neurological Exam


Neurological exam: Alert, CN II-XII Intact, Normal Gait, Oriented x3, Reflexes 

Normal





- Psychiatric Exam


Psychiatric exam: Normal Affect, Normal Mood





- Skin


Skin Exam: Dry, Intact, Normal Color, Warm





Results





- Vital Signs


Recent Vital Signs: 





                                Last Vital Signs











Temp  98.1 F   03/30/19 07:54


 


Pulse  79   03/30/19 07:54


 


Resp  19   03/30/19 07:54


 


BP  124/72   03/30/19 07:54


 


Pulse Ox  98   03/30/19 07:54














- Labs


Result Diagrams: 


                                 03/29/19 16:12





                                 03/29/19 16:12


Labs: 





                         Laboratory Results - last 24 hr











  03/29/19 03/29/19





  16:12 16:12


 


WBC   9.2


 


RBC   4.17


 


Hgb   14.5  D


 


Hct   42.4


 


MCV   101.5 H D


 


MCH   34.6 H


 


MCHC   34.1


 


RDW   13.3


 


Plt Count   291


 


MPV   9.8


 


Neut % (Auto)   69.2


 


Lymph % (Auto)   15.8 L


 


Mono % (Auto)   9.3


 


Eos % (Auto)   4.9 H


 


Baso % (Auto)   0.8


 


Neut # (Auto)   6.4


 


Lymph # (Auto)   1.5


 


Mono # (Auto)   0.9 H


 


Eos # (Auto)   0.4


 


Baso # (Auto)   0.1


 


Sodium  138 


 


Potassium  4.2 


 


Chloride  104 


 


Carbon Dioxide  25 


 


Anion Gap  13 


 


BUN  20 H 


 


Creatinine  0.7 


 


Est GFR ( Amer)  > 60 


 


Est GFR (Non-Af Amer)  > 60 


 


Random Glucose  97 


 


Calcium  9.4 














Assessment & Plan


(1) Knee pain


Assessment and Plan: 


pain control


xr


ortho


pt/ot


Status: Acute   





(2) Hip injury


Assessment and Plan: 


pending mri report


pain congtrol


ortho


pt/ot


Status: Acute   





(3) DVT prophylaxis


Assessment and Plan: 


scd and ae hose





Status: Acute   Priority: Low   





(4) Gait instability


Assessment and Plan: 


pt/ot


incr outpt HHA hrs


Status: Acute   Priority: High   





Decision To Admit





- Pt Status Changed To:


Hospital Disposition Of: Observation





- .


Bed Request Type: Med/Surg


Admitting Physician: Marco Antonio Jensen

## 2019-03-31 LAB
ALBUMIN SERPL-MCNC: 3.7 G/DL (ref 3.5–5)
ALBUMIN/GLOB SERPL: 1.3 {RATIO} (ref 1–2.1)
ALT SERPL-CCNC: 16 U/L (ref 9–52)
AST SERPL-CCNC: 18 U/L (ref 14–36)
BASOPHILS # BLD AUTO: 0.1 K/UL (ref 0–0.2)
BASOPHILS NFR BLD: 0.9 % (ref 0–2)
BUN SERPL-MCNC: 18 MG/DL (ref 7–17)
CALCIUM SERPL-MCNC: 8.9 MG/DL (ref 8.4–10.2)
EOSINOPHIL # BLD AUTO: 0.7 K/UL (ref 0–0.7)
EOSINOPHIL NFR BLD: 7.9 % (ref 0–4)
ERYTHROCYTE [DISTWIDTH] IN BLOOD BY AUTOMATED COUNT: 13 % (ref 11.5–14.5)
GFR NON-AFRICAN AMERICAN: > 60
HGB BLD-MCNC: 13.8 G/DL (ref 12–16)
LYMPHOCYTES # BLD AUTO: 1.5 K/UL (ref 1–4.3)
LYMPHOCYTES NFR BLD AUTO: 16.1 % (ref 20–40)
MCH RBC QN AUTO: 34.3 PG (ref 27–31)
MCHC RBC AUTO-ENTMCNC: 33.5 G/DL (ref 33–37)
MCV RBC AUTO: 102.6 FL (ref 81–99)
MONOCYTES # BLD: 0.8 K/UL (ref 0–0.8)
MONOCYTES NFR BLD: 9 % (ref 0–10)
NEUTROPHILS # BLD: 6 K/UL (ref 1.8–7)
NEUTROPHILS NFR BLD AUTO: 66.1 % (ref 50–75)
NRBC BLD AUTO-RTO: 0.1 % (ref 0–0)
PLATELET # BLD: 243 K/UL (ref 130–400)
PMV BLD AUTO: 9.8 FL (ref 7.2–11.7)
RBC # BLD AUTO: 4.01 MIL/UL (ref 3.8–5.2)
WBC # BLD AUTO: 9.1 K/UL (ref 4.8–10.8)

## 2019-03-31 RX ADMIN — PANTOPRAZOLE SODIUM SCH MG: 20 TABLET, DELAYED RELEASE ORAL at 09:26

## 2019-03-31 RX ADMIN — FLUTICASONE PROPIONATE AND SALMETEROL SCH PUFF: 50; 500 POWDER RESPIRATORY (INHALATION) at 21:41

## 2019-03-31 RX ADMIN — RANOLAZINE SCH MG: 500 TABLET, FILM COATED, EXTENDED RELEASE ORAL at 09:26

## 2019-03-31 RX ADMIN — OXYCODONE HYDROCHLORIDE AND ACETAMINOPHEN PRN TAB: 5; 325 TABLET ORAL at 06:24

## 2019-03-31 RX ADMIN — OMEGA-3-ACID ETHYL ESTERS SCH GM: 900 CAPSULE, LIQUID FILLED ORAL at 09:24

## 2019-03-31 RX ADMIN — OMEGA-3-ACID ETHYL ESTERS SCH GM: 900 CAPSULE, LIQUID FILLED ORAL at 21:51

## 2019-03-31 RX ADMIN — FLUTICASONE PROPIONATE AND SALMETEROL SCH PUFF: 50; 500 POWDER RESPIRATORY (INHALATION) at 09:20

## 2019-03-31 RX ADMIN — OXYCODONE HYDROCHLORIDE AND ACETAMINOPHEN PRN TAB: 5; 325 TABLET ORAL at 14:35

## 2019-03-31 RX ADMIN — RANOLAZINE SCH MG: 500 TABLET, FILM COATED, EXTENDED RELEASE ORAL at 21:42

## 2019-03-31 NOTE — CP.PCM.PN
Subjective





- Date & Time of Evaluation


Date of Evaluation: 03/31/19


Time of Evaluation: 19:07





- Subjective


Subjective: 





pt doing well still with lle pain


xr report noted pendinfbg mri report


pending pt ot evals





Objective





- Vital Signs/Intake and Output


Vital Signs (last 24 hours): 


                                        











Temp Pulse Resp BP Pulse Ox


 


 98 F   90   20   122/72   97 


 


 03/31/19 16:45  03/31/19 16:53  03/31/19 16:45  03/31/19 16:53  03/31/19 16:45











- Medications


Medications: 


                               Current Medications





Atorvastatin Calcium (Lipitor)  10 mg PO HS [facility]


   Last Admin: 03/30/19 22:03 Dose:  10 mg


Carvedilol (Coreg)  3.125 mg PO Q12@0500,1700 [facility]


   Last Admin: 03/31/19 16:53 Dose:  3.125 mg


Clopidogrel Bisulfate (Plavix)  75 mg PO DAILY [facility]


   Last Admin: 03/31/19 09:26 Dose:  75 mg


Furosemide (Lasix)  40 mg PO DAILY [facility]


   Last Admin: 03/31/19 09:21 Dose:  40 mg


Gabapentin (Neurontin)  100 mg PO Q12 [facility]


   Last Admin: 03/31/19 09:25 Dose:  100 mg


Ketorolac Tromethamine (Toradol)  30 mg IVP Q6 PRN


   PRN Reason: Pain, moderate (4-7)


Levothyroxine Sodium (Synthroid)  88 mcg PO SUSA [facility]


   Last Admin: 03/31/19 15:30 Dose:  88 mcg


Levothyroxine Sodium (Synthroid)  75 mcg PO MOTUWETHFR [facility]


   Last Admin: 03/29/19 16:50 Dose:  75 mcg


Lidocaine (Lidoderm)  1 ea TD DAILY PRN


   PRN Reason: Pain, Mild (1-3)


   Last Admin: 03/31/19 09:22 Dose:  1 ea


Megestrol Acetate (Megace)  20 mg PO DAILY [facility]


   Last Admin: 03/31/19 09:25 Dose:  20 mg


Mirtazapine (Remeron)  30 mg PO HS [facility]


   Last Admin: 03/30/19 22:03 Dose:  30 mg


Omega-3-Acid Ethyl Esters (Lovaza)  1 gm PO Q12 [facility]


   Last Admin: 03/31/19 09:24 Dose:  1 gm


Oxycodone/Acetaminophen (Percocet 5/325 Mg Tab)  1 tab PO Q4 PRN


   PRN Reason: Pain, severe (8-10)


   Stop: 04/02/19 06:51


   Last Admin: 03/31/19 14:35 Dose:  1 tab


Pantoprazole Sodium (Protonix Ec Tab)  20 mg PO DAILY [facility]


   Last Admin: 03/31/19 09:26 Dose:  20 mg


Ranolazine (Ranexa)  500 mg PO Q12 [facility]


   Last Admin: 03/31/19 09:26 Dose:  500 mg


Fluticasone/Salmeterol (Advair Diskus 500/50)  1 puff IH Q12 [facility]


   Last Admin: 03/31/19 09:20 Dose:  1 puff


Spironolactone (Aldactone)  25 mg PO BID [facility]


   Last Admin: 03/31/19 16:50 Dose:  25 mg











- Labs


Labs: 


                                        





                                 03/31/19 05:15 





                                 03/31/19 05:15

## 2019-04-01 VITALS — RESPIRATION RATE: 18 BRPM

## 2019-04-01 RX ADMIN — FLUTICASONE PROPIONATE AND SALMETEROL SCH PUFF: 50; 500 POWDER RESPIRATORY (INHALATION) at 21:42

## 2019-04-01 RX ADMIN — FLUTICASONE PROPIONATE AND SALMETEROL SCH PUFF: 50; 500 POWDER RESPIRATORY (INHALATION) at 08:51

## 2019-04-01 RX ADMIN — RANOLAZINE SCH MG: 500 TABLET, FILM COATED, EXTENDED RELEASE ORAL at 21:43

## 2019-04-01 RX ADMIN — OXYCODONE HYDROCHLORIDE AND ACETAMINOPHEN PRN TAB: 5; 325 TABLET ORAL at 07:23

## 2019-04-01 RX ADMIN — OMEGA-3-ACID ETHYL ESTERS SCH GM: 900 CAPSULE, LIQUID FILLED ORAL at 21:42

## 2019-04-01 RX ADMIN — OXYCODONE HYDROCHLORIDE AND ACETAMINOPHEN PRN TAB: 5; 325 TABLET ORAL at 15:59

## 2019-04-01 RX ADMIN — RANOLAZINE SCH MG: 500 TABLET, FILM COATED, EXTENDED RELEASE ORAL at 08:51

## 2019-04-01 RX ADMIN — OMEGA-3-ACID ETHYL ESTERS SCH: 900 CAPSULE, LIQUID FILLED ORAL at 21:45

## 2019-04-01 RX ADMIN — PANTOPRAZOLE SODIUM SCH MG: 20 TABLET, DELAYED RELEASE ORAL at 08:51

## 2019-04-01 RX ADMIN — OMEGA-3-ACID ETHYL ESTERS SCH GM: 900 CAPSULE, LIQUID FILLED ORAL at 08:52

## 2019-04-01 NOTE — RAD
Date of service: 



03/31/2019



PROCEDURE:  Left Ankle Radiographs.



HISTORY:

 left ankle pain 



COMPARISON:

None available.



TECHNIQUE:

3 views obtained.



FINDINGS:



BONES:

Insert osteopenia suggests osteoporosis limiting the identification 

of nondisplaced fracture no displaced fracture appreciable throughout 

the left ankle.  A small plantar calcaneal spurs identified. 



JOINTS:

Normal. No osteoarthritis. Ankle mortise maintained. Talar dome intact



SOFT TISSUES:

Vascular calcification identified at the anterior and posterior ankle 

soft tissues as well as distal leg soft tissues. 



OTHER FINDINGS:

None.



IMPRESSION:

No acute displaced fracture.  No destructive bony lesion appreciated 

throughout the left ankle.  Diffuse osteopenia suggests osteoporosis.



Concordant preliminary report from [facility], 03/31/2019.

## 2019-04-01 NOTE — CP.PCM.PN
Subjective





- Date & Time of Evaluation


Date of Evaluation: 04/01/19


Time of Evaluation: 13:34





- Subjective


Subjective: 





ptremains still w/ lle discomfort


full consult dictated [id number]





Objective





- Vital Signs/Intake and Output


Vital Signs (last 24 hours): 


                                        











Temp Pulse Resp BP Pulse Ox


 


 97.8 F   96 H  19   130/71   98 


 


 04/01/19 07:57  04/01/19 07:57  04/01/19 07:57  04/01/19 08:52  04/01/19 07:57











- Medications


Medications: 


                               Current Medications





Atorvastatin Calcium (Lipitor)  10 mg PO HS [facility]


   Last Admin: 03/31/19 21:42 Dose:  10 mg


Carvedilol (Coreg)  3.125 mg PO Q12@0500,1700 [facility]


   Last Admin: 04/01/19 05:34 Dose:  3.125 mg


Clopidogrel Bisulfate (Plavix)  75 mg PO DAILY [facility]


   Last Admin: 04/01/19 08:52 Dose:  75 mg


Docusate Sodium (Colace)  100 mg PO BID [facility]


Furosemide (Lasix)  40 mg PO DAILY [facility]


   Last Admin: 04/01/19 08:52 Dose:  40 mg


Gabapentin (Neurontin)  100 mg PO Q12 [facility]


   Last Admin: 04/01/19 08:52 Dose:  100 mg


Ketorolac Tromethamine (Toradol)  30 mg IVP Q6 PRN


   PRN Reason: Pain, moderate (4-7)


Lactulose (Enulose)  20 gm PO DAILY PRN


   PRN Reason: Constipation


Levothyroxine Sodium (Synthroid)  88 mcg PO SUSA [facility]


   Last Admin: 03/31/19 15:30 Dose:  88 mcg


Levothyroxine Sodium (Synthroid)  75 mcg PO MOTUWETHFR [facility]


   Last Admin: 03/29/19 16:50 Dose:  75 mcg


Lidocaine (Lidoderm)  1 ea TD DAILY PRN


   PRN Reason: Pain, Mild (1-3)


   Last Admin: 03/31/19 09:22 Dose:  1 ea


Megestrol Acetate (Megace)  20 mg PO DAILY [facility]


   Last Admin: 04/01/19 08:51 Dose:  20 mg


Mirtazapine (Remeron)  30 mg PO HS [facility]


   Last Admin: 03/31/19 21:42 Dose:  30 mg


Omega-3-Acid Ethyl Esters (Lovaza)  1 gm PO Q12 [facility]


   Last Admin: 04/01/19 08:52 Dose:  1 gm


Oxycodone/Acetaminophen (Percocet 5/325 Mg Tab)  1 tab PO Q4 PRN


   PRN Reason: Pain, severe (8-10)


   Stop: 04/02/19 06:51


   Last Admin: 04/01/19 07:23 Dose:  1 tab


Pantoprazole Sodium (Protonix Ec Tab)  20 mg PO DAILY [facility]


   Last Admin: 04/01/19 08:51 Dose:  20 mg


Ranolazine (Ranexa)  500 mg PO Q12 [facility]


   Last Admin: 04/01/19 08:51 Dose:  500 mg


Fluticasone/Salmeterol (Advair Diskus 500/50)  1 puff IH Q12 [facility]


   Last Admin: 04/01/19 08:51 Dose:  1 puff


Spironolactone (Aldactone)  25 mg PO BID [facility]


   Last Admin: 04/01/19 08:51 Dose:  25 mg











- Labs


Labs: 


                                        





                                 03/31/19 05:15 





                                 03/31/19 05:15 











Assessment and Plan


(1) Knee pain


Status: Acute   





(2) Hip injury


Status: Acute   





(3) DVT prophylaxis


Status: Acute   





(4) Gait instability


Status: Acute

## 2019-04-01 NOTE — PQF
PROVIDER RESPONSE TEXT:



Unknown type, chronic disease



REVIEWER QUERY TEXT:



CHF Acuity and Type





Medication: Lasix, Coreg, Aldactone

2017 OLD Echo: EF 30-35%, Hypokinesis, Transmitral doppler flow pattern is Grade I abnormal relaxatio
n pattern, AR



Please document the type and acuity (includes probable or suspected)



Such as:

Type:

-- Systolic

-- Diastolic

-- Combined

-- Other, please specify



Acuity:

-- Acute

-- Chronic

-- Acute on chronic

-- Other, please specify







The patient's Clinical Indicators include:

Admitted s/p fall. Pain L knee and hip.





Query created by: Shae Shrestha on 4/1/2019 8:29 AM





Electronically signed by:  Ramon DE LA ROSA 4/1/2019 1:29 PM

## 2019-04-01 NOTE — MRI
MRI left hip 



HISTORY:

Hip pain. 



COMPARISON:

None available. 



Technique: Multi-echo multiplanar sequences were performed through 

the left hip without the use of intravenous contrast. 



Findings: 



Please note only coronal T1 and coronal STIR sequences as well as 

axial proton density fat saturated sequences were obtained.  Patient 

refused further examination. 



Curvilinear signal abnormality seen at the level of the right 

posterior superior pubic bone near the junction with the right 

anterior acetabulum suggestive for a nondisplaced fracture. 



Additional signal abnormality with linear decreased T1 signal and 

increased STIR signal seen within the superior left pubic bone as 

well as the mid inferior left pubic bone suggestive for nondisplaced 

fractures. 



Additional curvilinear signal abnormality with decreased T1 signal 

and increased STIR signal seen within the left iliac bone near the SI 

joint extending to the level of the SI joint also suggestive for 

nondisplaced fracture. 



Moderate insertional tendinopathy of the left gluteus tendon 

attachments on the greater trochanter. 



Moderate insertional tendinopathy of the right gluteus tendon 

attachments on the greater trochanter. 



Fecal retention in the colon. 



Incidentally noted is a 1.5 centimeter left adnexal cyst. 



Impression: 



Nondisplaced fracture deformities as described above seen within the 

right posterior superior pubic bone near the junction with the right 

acetabulum, left superior and inferior pubic bones, and left iliac 

bone near the SI joint. 



Additional findings as above. 



These findings were discussed with Dr. Ran Garcia at 12 p.m. on 

04/01/2019.

## 2019-04-01 NOTE — CP.PCM.CON
History of Present Illness





- History of Present Illness


History of Present Illness: 





Orthopedic consult: Dr. De La O





Patient is a 87 y/o female who presented to [facility] ER with c/o of L hip and ankle 

pain.  Patient reports a mechanical slip and fall which occurred at her home 4 

days ago.  Following the injury she had much difficulty ambulating and WB.  

Currently, her L hip pain is minimal, much improved since time of admission.  

The pain is located laterally and is worse with palpation and alleviated with 

inactivity.  Her left ankle pain is moderate and located diffusely.  She has 

pain with ROM and WB and no mild pain with inactivity.  She denies any numbness 

or tingling to the LLE.  She denies CP/SOB/N/V/D/fever/dysuria/melena.  





PMH: Anxiety, Arthritis, Asthma, Bronchitis, CAD, CHF, CVA, HTN, 

Hypercholesterolemia, Hyperlipidemia, Hypothyroidism, Osteoporosis


PSH:appendectomy


meds: as per Med rec


allergy: PCN


SH: denies tobacco/ETOH/drug use





Review of Systems





- Review of Systems


All systems: reviewed and no additional remarkable complaints except


Review of Systems: 





as per HPI





Past Patient History





- Past Medical History & Family History


Past Medical History?: Yes


Past Family History: Reviewed and not pertinent





- Past Social History


Smoking Status: Never Smoked





- CARDIAC


Hx Cardiac Disorders: Yes


Hx Congestive Heart Failure: Yes


Hx Hypercholesterolemia: Yes


Hx Hypertension: Yes


Hx Pacemaker: No





- PULMONARY


Hx Respiratory Disorders: Yes


Hx Asthma: Yes


Hx Bronchitis: Yes


Hx Chronic Obstructive Pulmonary Disease (COPD): No





- NEUROLOGICAL


Hx Neurological Disorder: No


Hx Paralysis: No





- HEENT


Hx HEENT Problems: Yes


Hx Cataracts: Yes





- RENAL


Hx Chronic Kidney Disease: No





- ENDOCRINE/METABOLIC


Hx Endocrine Disorders: Yes


Hx Hypothyroidism: Yes





- HEMATOLOGICAL/ONCOLOGICAL


Hx Blood Disorders: No


Hx Human Immunodeficiency Virus (HIV): No





- INTEGUMENTARY


Hx Dermatological Problems: No





- MUSCULOSKELETAL/RHEUMATOLOGICAL


Hx Musculoskeletal Disorders: Yes


Hx Falls: Yes


Hx Osteoporosis: Yes





- GASTROINTESTINAL


Hx Gastrointestinal Disorders: Yes


Hx Bowel Surgery: Yes (Colon Sx due to Colon Ca)





- GENITOURINARY/GYNECOLOGICAL


Hx Genitourinary Disorders: No





- PSYCHIATRIC


Hx Psychophysiologic Disorder: Yes


Hx Anxiety: Yes


Hx Substance Use: No





- SURGICAL HISTORY


Hx Surgeries: Yes


Hx Appendectomy: Yes





- ANESTHESIA


Hx Anesthesia: Yes


Hx Anesthesia Reactions: No


Hx Malignant Hyperthermia: No





Meds


Allergies/Adverse Reactions: 


                                    Allergies











Allergy/AdvReac Type Severity Reaction Status Date / Time


 


Penicillins Allergy Severe RASH Verified 03/29/19 12:04














- Medications


Medications: 


                               Current Medications





Atorvastatin Calcium (Lipitor)  10 mg PO [facility]


   Last Admin: 03/31/19 21:42 Dose:  10 mg


Carvedilol (Coreg)  3.125 mg PO Q12@0500,1700 [facility]


   Last Admin: 04/01/19 05:34 Dose:  3.125 mg


Clopidogrel Bisulfate (Plavix)  75 mg PO DAILY [facility]


   Last Admin: 03/31/19 09:26 Dose:  75 mg


Furosemide (Lasix)  40 mg PO DAILY [facility]


   Last Admin: 03/31/19 09:21 Dose:  40 mg


Gabapentin (Neurontin)  100 mg PO Q12 [facility]


   Last Admin: 03/31/19 21:51 Dose:  100 mg


Ketorolac Tromethamine (Toradol)  30 mg IVP Q6 PRN


   PRN Reason: Pain, moderate (4-7)


Levothyroxine Sodium (Synthroid)  88 mcg PO SUSA [facility]


   Last Admin: 03/31/19 15:30 Dose:  88 mcg


Levothyroxine Sodium (Synthroid)  75 mcg PO MOTUWETHFR [facility]


   Last Admin: 03/29/19 16:50 Dose:  75 mcg


Lidocaine (Lidoderm)  1 ea TD DAILY PRN


   PRN Reason: Pain, Mild (1-3)


   Last Admin: 03/31/19 09:22 Dose:  1 ea


Megestrol Acetate (Megace)  20 mg PO DAILY [facility]


   Last Admin: 03/31/19 09:25 Dose:  20 mg


Mirtazapine (Remeron)  30 mg PO [facility]


   Last Admin: 03/31/19 21:42 Dose:  30 mg


Omega-3-Acid Ethyl Esters (Lovaza)  1 gm PO Q12 [facility]


   Last Admin: 03/31/19 21:51 Dose:  1 gm


Oxycodone/Acetaminophen (Percocet 5/325 Mg Tab)  1 tab PO Q4 PRN


   PRN Reason: Pain, severe (8-10)


   Stop: 04/02/19 06:51


   Last Admin: 04/01/19 07:23 Dose:  1 tab


Pantoprazole Sodium (Protonix Ec Tab)  20 mg PO DAILY [facility]


   Last Admin: 03/31/19 09:26 Dose:  20 mg


Ranolazine (Ranexa)  500 mg PO Q12 [facility]


   Last Admin: 03/31/19 21:42 Dose:  500 mg


Fluticasone/Salmeterol (Advair Diskus 500/50)  1 puff IH Q12 [facility]


   Last Admin: 03/31/19 21:41 Dose:  1 puff


Spironolactone (Aldactone)  25 mg PO BID [facility]


   Last Admin: 03/31/19 16:50 Dose:  25 mg











Physical Exam





- Constitutional


Appears: Well, No Acute Distress





- Head Exam


Head Exam: ATRAUMATIC, NORMOCEPHALIC





- Eye Exam


Eye Exam: EOMI, Normal appearance





- ENT Exam


ENT Exam: Mucous Membranes Moist





- Respiratory Exam


Respiratory Exam: NORMAL BREATHING PATTERN





- Extremities Exam


Additional comments: 


LLE: mild tenderness over GT, no tenderness to groin


diffuse tenderness to medial/lateral ankle


no swelling/ecchymosis/lesions


sensation intact SP/DP/TN


motor intact EHL/FHL/TA/G


pedal pulse intact


calves soft NT b/l





- Neurological Exam


Neurological exam: Alert, Oriented x3





- Psychiatric Exam


Psychiatric exam: Normal Affect, Normal Mood





- Skin


Skin Exam: Normal Color, Warm





Results





- Vital Signs


Recent Vital Signs: 


                                Last Vital Signs











Temp  97.8 F   04/01/19 07:57


 


Pulse  96 H  04/01/19 07:57


 


Resp  19   04/01/19 07:57


 


BP  130/71   04/01/19 07:57


 


Pulse Ox  98   04/01/19 07:57














- Labs


Result Diagrams: 


                                 03/31/19 05:15





                                 03/31/19 05:15





- Impressions


Impression: 


Accession No. : [id number]


Patient Name / ID : JACKIE CADET  / [id number]


Exam Date : 03/29/2019 12:27:39 ( Approved )


Study Comment : 


Sex / Age : F  / 86Y





Creator : Sari Hicks MD


Dictator : Sari Hicks MD


 : 


Approver : Sari Hicks MD


Approver2 : 





Report Date : 03/29/2019 13:14:02


My Comment : 


*******************

****************************************************************





PROCEDURE:  Left Hip X-ray Radiographs.





HISTORY:


 hip pain s/p fall 





COMPARISON:


None.





TECHNIQUE:


3 views obtained.





FINDINGS:





BONES:


The pelvic ring is intact.  There is diffuse bone demineralization.  There is no

acute displaced fracture or bone destruction.





JOINTS:


There is mild degenerative osteoarthrosis in the hip joints and sacroiliac 

joints. 





SOFT TISSUES:


Normal. 





OTHER FINDINGS:


There atherosclerotic vascular calcification.  There is a endovascular stent 

graft in the right external iliac artery.





IMPRESSION:


No acute displaced fracture or dislocation. Please note occult fractures cannot 

be excluded on plain radiographs.  If there is a persistent clinical concern, an

MRI of the hip may be performed for further evaluation.





Accession No. : [id number]


Patient Name / ID : JACKIE CADET  / [id number]


Exam Date : 03/31/2019 15:48:44 ( Approved )


Study Comment : 


Sex / Age : F  / 86Y





Creator : Juan Jose El MD


Dictator : Juan Jose El MD


 : 


Approver : Juan Jose El MD


Approver2 : 





Report Date : 04/01/2019 08:13:04


My Comment : 


*********************************************

**************************************





Date of service: 





03/31/2019





PROCEDURE:  Left Ankle Radiographs.





HISTORY:


 left ankle pain 





COMPARISON:


None available.





TECHNIQUE:


3 views obtained.





FINDINGS:





BONES:


Insert osteopenia suggests osteoporosis limiting the identification of 

nondisplaced fracture no displaced fracture appreciable throughout the left 

ankle.  A small plantar calcaneal spurs identified. 





JOINTS:


Normal. No osteoarthritis. Ankle mortise maintained. Talar dome intact





SOFT TISSUES:


Vascular calcification identified at the anterior and posterior ankle soft 

tissues as well as distal leg soft tissues. 





OTHER FINDINGS:


None.





IMPRESSION:


No acute displaced fracture.  No destructive bony lesion appreciated throughout 

the left ankle.  Diffuse osteopenia suggests osteoporosis.





[facility] preliminary report from [employer], 03/31/2019.








Accession No. : [id number]


Patient Name / ID : JACKIE CADET  / [id number]


Exam Date : 03/30/2019 13:00:11 ( Approved )


Study Comment : 


Sex / Age : F  / 86Y





Creator : Cassius Archuleta MD


Dictator : Cassius Archuleta MD


 : 


Approver : Cassius Archuleta MD


Approver2 : 





Report Date : 03/30/2019 16:54:25


My Comment : 


*****

******************************************************************************





Date of service: 





03/30/2019





PROCEDURE:  Left Knee Radiographs.





HISTORY:


Pain.





COMPARISON:


None.





TECHNIQUE:


2 views obtained.





FINDINGS:





BONES:


No evidence of acute displaced fracture nor dislocation.  Diffuse liliane

neralization. 





JOINTS:


Mild tricompartmental degenerative osteoarthritis with apparent chondrocalci

nosis; rule out CPPD. 





JOINT EFFUSION:


No significant joint effusion 





OTHER FINDINGS:


None.





IMPRESSION:


No evidence of acute displaced fracture nor dislocation.  





Mild DJD osteoarthritis with apparent chondrocalcinosis; rule out CP PD





Diffuse demineralization.








Accession No. : [id number]


Patient Name / ID : JACKIE CADET  / [id number]


Exam Date : 03/29/2019 15:04:43 ( Approved )


Study Comment : 


Sex / Age : F  / 86Y





Creator : Hira Hdz MD


Dictator : Hira Hdz MD


 : 


Approver : Hira Hdz MD


Approver2 : 





Report Date : 04/01/2019 12:10:32


My Comment : 


*

********************************************************************************


**





MRI left hip 





HISTORY:


Hip pain. 





COMPARISON:


None available. 





Technique: Multi-echo multiplanar sequences were performed through the left hip 

without the use of intravenous contrast. 





Findings: 





Please note only coronal T1 and coronal STIR sequences as well as axial proton 

density fat saturated sequences were obtained.  Patient refused further 

examination. 





Curvilinear signal abnormality seen at the level of the right posterior superior

pubic bone near the junction with the right anterior acetabulum suggestive for a

nondisplaced fracture. 





Additional signal abnormality with linear decreased T1 signal and increased STIR

signal seen within the superior left pubic bone as well as the mid inferior left

pubic bone suggestive for nondisplaced fractures. 





Additional curvilinear signal abnormality with decreased T1 signal and increased

STIR signal seen within the left iliac bone near the SI joint extending to the 

level of the SI joint also suggestive for nondisplaced fracture. 





Moderate insertional tendinopathy of the left gluteus tendon attachments on the 

greater trochanter. 





Moderate insertional tendinopathy of the right gluteus tendon attachments on the

greater trochanter. 





Fecal retention in the colon. 





Incidentally noted is a 1.5 centimeter left adnexal cyst. 





Impression: 





Nondisplaced fracture deformities as described above seen within the right 

posterior superior pubic bone near the junction with the right acetabulum, left 

superior and inferior pubic bones, and left iliac bone near the SI joint. 





Additional findings as above. 





These findings were discussed with Dr. Ran Garcia at 12 p.m. on 04/01/2019.












































Assessment & Plan


(1) Contusion, hip


Assessment and Plan: 


Xray reveal no evidence of acute fracture or injury


MRI report shows findings suggestive of ND fx's of R and L superior pubic rami 

and L iliac.  However, patient does not have pain in these areas


Dr. De La O recommends conservative management at this time


PT/OT WBAT with RW


pain control


ice 


d/w Dr. De La O who agrees with above


Status: Acute   





(2) Ankle sprain


Assessment and Plan: 


Xrays show no acute injury or fracture


No acute orthopedic intervention needed at this time


ankle support


ice/elevate


PT/OT WBAT





Status: Acute   





- Date & Time


Date: 04/01/19


Time: 08:00

## 2019-04-02 VITALS — SYSTOLIC BLOOD PRESSURE: 129 MMHG | OXYGEN SATURATION: 98 % | HEART RATE: 89 BPM | DIASTOLIC BLOOD PRESSURE: 73 MMHG

## 2019-04-02 VITALS — TEMPERATURE: 98.1 F

## 2019-04-02 RX ADMIN — RANOLAZINE SCH MG: 500 TABLET, FILM COATED, EXTENDED RELEASE ORAL at 08:52

## 2019-04-02 RX ADMIN — FLUTICASONE PROPIONATE AND SALMETEROL SCH PUFF: 50; 500 POWDER RESPIRATORY (INHALATION) at 08:51

## 2019-04-02 RX ADMIN — OMEGA-3-ACID ETHYL ESTERS SCH GM: 900 CAPSULE, LIQUID FILLED ORAL at 08:52

## 2019-04-02 RX ADMIN — PANTOPRAZOLE SODIUM SCH MG: 20 TABLET, DELAYED RELEASE ORAL at 08:53

## 2019-04-02 NOTE — CP.PCM.PN
Subjective





- Date & Time of Evaluation


Date of Evaluation: 04/02/19


Time of Evaluation: 08:26





- Subjective


Subjective: 





pt doing well. pain more controlled. no f/c n/v/d. for jai. mri report noted. 

ortho consults appriciated-conservative management





Objective





- Vital Signs/Intake and Output


Vital Signs (last 24 hours): 


                                        











Temp Pulse Resp BP Pulse Ox


 


 97.8 F   92 H  18   113/70   96 


 


 04/01/19 23:55  04/02/19 04:49  04/01/19 23:55  04/02/19 04:49  04/01/19 23:55











- Medications


Medications: 


                               Current Medications





Atorvastatin Calcium (Lipitor)  10 mg PO HS [facility]


   Last Admin: 04/01/19 21:42 Dose:  10 mg


Carvedilol (Coreg)  3.125 mg PO Q12@0500,1700 [facility]


   Last Admin: 04/02/19 04:49 Dose:  Not Given


Clopidogrel Bisulfate (Plavix)  75 mg PO DAILY [facility]


   Last Admin: 04/01/19 08:52 Dose:  75 mg


Docusate Sodium (Colace)  100 mg PO BID [facility]


   Last Admin: 04/01/19 16:03 Dose:  100 mg


Furosemide (Lasix)  40 mg PO DAILY [facility]


   Last Admin: 04/01/19 08:52 Dose:  40 mg


Gabapentin (Neurontin)  100 mg PO Q12 [facility]


   Last Admin: 04/01/19 21:42 Dose:  100 mg


Ketorolac Tromethamine (Toradol)  30 mg IVP Q6 PRN


   PRN Reason: Pain, moderate (4-7)


Lactulose (Enulose)  20 gm PO DAILY PRN


   PRN Reason: Constipation


   Last Admin: 04/01/19 15:58 Dose:  20 gm


Levothyroxine Sodium (Synthroid)  88 mcg PO SUSA [facility]


   Last Admin: 03/31/19 15:30 Dose:  88 mcg


Levothyroxine Sodium (Synthroid)  75 mcg PO MOTUWETHFR [facility]


   Last Admin: 04/01/19 16:06 Dose:  75 mcg


Lidocaine (Lidoderm)  1 ea TD DAILY PRN


   PRN Reason: Pain, Mild (1-3)


   Last Admin: 04/01/19 16:00 Dose:  1 ea


Megestrol Acetate (Megace)  20 mg PO DAILY [facility]


   Last Admin: 04/01/19 08:51 Dose:  20 mg


Mirtazapine (Remeron)  30 mg PO HS [facility]


   Last Admin: 04/01/19 21:43 Dose:  30 mg


Omega-3-Acid Ethyl Esters (Lovaza)  1 gm PO Q12 [facility]


   Last Admin: 04/01/19 21:45 Dose:  Not Given


Pantoprazole Sodium (Protonix Ec Tab)  20 mg PO DAILY [facility]


   Last Admin: 04/01/19 08:51 Dose:  20 mg


Ranolazine (Ranexa)  500 mg PO Q12 [facility]


   Last Admin: 04/01/19 21:43 Dose:  500 mg


Fluticasone/Salmeterol (Advair Diskus 500/50)  1 puff IH Q12 [facility]


   Last Admin: 04/01/19 21:42 Dose:  1 puff


Spironolactone (Aldactone)  25 mg PO BID [facility]


   Last Admin: 04/01/19 16:03 Dose:  25 mg











- Labs


Labs: 


                                        





                                 03/31/19 05:15 





                                 03/31/19 05:15 











- Constitutional


Appears: Well, Non-toxic, No Acute Distress





- Head Exam


Head Exam: ATRAUMATIC, NORMAL INSPECTION, NORMOCEPHALIC





- Eye Exam


Eye Exam: EOMI, Normal appearance, PERRL


Pupil Exam: NORMAL ACCOMODATION, PERRL





- ENT Exam


ENT Exam: Mucous Membranes Moist, Normal Exam





- Neck Exam


Neck Exam: Full ROM, Normal Inspection.  absent: Lymphadenopathy





- Respiratory Exam


Respiratory Exam: Clear to Ausculation Bilateral, NORMAL BREATHING PATTERN





- Cardiovascular Exam


Cardiovascular Exam: REGULAR RHYTHM, RRR, +S1, +S2.  absent: Murmur





- GI/Abdominal Exam


GI & Abdominal Exam: Soft, Normal Bowel Sounds.  absent: Tenderness





- Extremities Exam


Extremities Exam: Full ROM, Normal Capillary Refill, Normal Inspection.  absent:

Joint Swelling, Pedal Edema





- Back Exam


Back Exam: NORMAL INSPECTION





- Neurological Exam


Neurological Exam: Alert, Awake, CN II-XII Intact, Normal Gait, Oriented x3





- Psychiatric Exam


Psychiatric exam: Normal Affect, Normal Mood





- Skin


Skin Exam: Dry, Intact, Normal Color, Warm





Assessment and Plan


(1) Knee pain


Assessment & Plan: 


pain control


pt/ot


jai


ortho note reviewed


Status: Acute   





(2) Hip injury


Assessment & Plan: 


pain control


pt/ot


jai


ortho note reviewed


Status: Acute   





(3) DVT prophylaxis


Assessment & Plan: 


scd and ae hose


heparin


Status: Acute   





(4) Gait instability


Assessment & Plan: 


pt/ot


jai


Status: Acute

## 2019-04-02 NOTE — PQF
PROVIDER RESPONSE TEXT:



Pt has pain to left hip/knee and ankle. Osteoarthritis to all sites



REVIEWER QUERY TEXT:



Conflicting Documentation Clarification



Documentation of multiple diagnoses for the same clinical presentation appears in the record.  Please
 clarify the diagnosis/diagnoses, reason for this admission . SEE BELOW



DX: L hip injury unspecified, L hip contusion, L  Ankle sprain.  *** Ortho:: MRI report shows finding
s suggestive of ND fx's of R and L superior pubic rami and L iliac. However, patient does not have pa
in in these areas







Please also document if the condition is:

-- Confirmed and current

-- Confirmed, treated and resolved

-- Ruled out

-- Other, please specify



The patient's Clinical Indicators include:

ER: 85 y/o female presents to the ED with left hip pain. Patient states around 22:30, patient was wal
 through her apartment when she slipped and fell on her left side. She was able to get up and wal
k but pain became more severe today prompting ED visit.



L Hip and Pelvis X-ray: No acute displaced fracture or dislocation. Please note occult fractures joe
ot be excluded on plain radiographs



MRI: L Hip: Nondisplaced fracture deformities as described above seen within the  right posterior sup
erior pubic bone near the junction with the right  acetabulum, left superior and inferior pubic bones
, and left iliac

bone near the SI joint.



Rx: Toradol, PT





Query created by: Shae Shrestha on 4/2/2019 10:51 AM





Electronically signed by:  Ramon DE LA ROSA 4/2/2019 10:53 AM

## 2019-04-02 NOTE — PN
DATE:  04/01/2019



SUBJECTIVE:  The patient is seen in bed.  Doing well still with hip, knee,

and left ankle pain.  Cleared by Ortho for PT.  No distress.  No fever,

chills, nausea, vomiting, or diarrhea.



REVIEW OF SYSTEMS:  Left lower extremity pain.



PHYSICAL EXAMINATION:  Cognitive heart, lung, abdomen are all within normal

limits.  No complaints.  Some range of motion and discomfort to the left

lower extremity.



DIAGNOSTIC DATA: X-ray is noted.  MRI is still pending.



DIAGNOSIS AND PLAN:  Left lower extremity pain.  Cleared by Ortho for

physical therapy evaluation.  Discussed with social work for the patient to

go to subacute.  Continue pain control.  Deep venous thrombosis

prophylaxis, sequential compression devices, ae hose, and home

medications.  The patient for subacute when accepted.





__________________________________________

FERNANDO Velazquez





DD:  04/01/2019 11:00:00

DT:  04/01/2019 11:15:50

Job # [id number]

[id number]

## 2019-04-02 NOTE — CP.PCM.PN
Subjective





- Date & Time of Evaluation


Date of Evaluation: 04/02/19


Time of Evaluation: 08:00





- Subjective


Subjective: 


Patient seen and examined at bedside. No complaints of hip/pelvis pain.  C/o 

moderate L ankle pain.  Tolerated PT  yesterday ambulating 8 ft with RW. No 

other complaints.








Objective





- Vital Signs/Intake and Output


Vital Signs (last 24 hours): 


                                        











Temp Pulse Resp BP Pulse Ox


 


 97.8 F   92 H  18   113/70   96 


 


 04/01/19 23:55  04/02/19 04:49  04/01/19 23:55  04/02/19 04:49  04/01/19 23:55











- Medications


Medications: 


                               Current Medications





Atorvastatin Calcium (Lipitor)  10 mg PO HS [facility]


   Last Admin: 04/01/19 21:42 Dose:  10 mg


Carvedilol (Coreg)  3.125 mg PO Q12@0500,1700 [facility]


   Last Admin: 04/02/19 04:49 Dose:  Not Given


Clopidogrel Bisulfate (Plavix)  75 mg PO DAILY [facility]


   Last Admin: 04/01/19 08:52 Dose:  75 mg


Docusate Sodium (Colace)  100 mg PO BID [facility]


   Last Admin: 04/01/19 16:03 Dose:  100 mg


Furosemide (Lasix)  40 mg PO DAILY [facility]


   Last Admin: 04/01/19 08:52 Dose:  40 mg


Gabapentin (Neurontin)  100 mg PO Q12 [facility]


   Last Admin: 04/01/19 21:42 Dose:  100 mg


Ketorolac Tromethamine (Toradol)  30 mg IVP Q6 PRN


   PRN Reason: Pain, moderate (4-7)


Lactulose (Enulose)  20 gm PO DAILY PRN


   PRN Reason: Constipation


   Last Admin: 04/01/19 15:58 Dose:  20 gm


Levothyroxine Sodium (Synthroid)  88 mcg PO SUSA [facility]


   Last Admin: 03/31/19 15:30 Dose:  88 mcg


Levothyroxine Sodium (Synthroid)  75 mcg PO MOTUWETHFR [facility]


   Last Admin: 04/01/19 16:06 Dose:  75 mcg


Lidocaine (Lidoderm)  1 ea TD DAILY PRN


   PRN Reason: Pain, Mild (1-3)


   Last Admin: 04/01/19 16:00 Dose:  1 ea


Megestrol Acetate (Megace)  20 mg PO DAILY [facility]


   Last Admin: 04/01/19 08:51 Dose:  20 mg


Mirtazapine (Remeron)  30 mg PO HS [facility]


   Last Admin: 04/01/19 21:43 Dose:  30 mg


Omega-3-Acid Ethyl Esters (Lovaza)  1 gm PO Q12 [facility]


   Last Admin: 04/01/19 21:45 Dose:  Not Given


Pantoprazole Sodium (Protonix Ec Tab)  20 mg PO DAILY [facility]


   Last Admin: 04/01/19 08:51 Dose:  20 mg


Ranolazine (Ranexa)  500 mg PO Q12 [facility]


   Last Admin: 04/01/19 21:43 Dose:  500 mg


Fluticasone/Salmeterol (Advair Diskus 500/50)  1 puff IH Q12 [facility]


   Last Admin: 04/01/19 21:42 Dose:  1 puff


Spironolactone (Aldactone)  25 mg PO BID [facility]


   Last Admin: 04/01/19 16:03 Dose:  25 mg











- Labs


Labs: 


                                        





                                 03/31/19 05:15 





                                 03/31/19 05:15 











- Extremities Exam


Additional comments: 


LLE: mild tenderness over GT, no tenderness to groin


diffuse tenderness to medial/lateral ankle


no swelling/ecchymosis/lesions


sensation intact SP/DP/TN


motor intact EHL/FHL/TA/G


pedal pulse intact


calves soft NT b/l








Assessment and Plan


(1) Contusion, hip


Assessment & Plan: 


Cont. PT/OT


DVT ppx


orthopedically stable for d/c to [facility]


Status: Acute   





(2) Ankle sprain


Assessment & Plan: 


WBAT


ice elevate


pain control


Status: Acute

## 2021-07-28 NOTE — ED PDOC
HPI: SOB/CHF/COPD


Time Seen by Provider: 12/12/17 13:16


Chief Complaint (Nursing): Chest Pain


Chief Complaint (Provider): Dyspnea


History Per: Patient


History/Exam Limitations: no limitations


Onset/Duration Of Symptoms: Days (2 weeks)


Additional Complaint(s): 





Dyspnea, worsening.  ?chest tightness.  No weakness, headaches, dizziness, cough

, congestion, leg pain.  No fever, headaches.  Tried tx at home and not 

helping.  Sees Dr. Chauhan and has some issues with heart, unsure what. 





Past Medical History


Vital Signs: 


 Last Vital Signs











Temp  98 F   12/12/17 13:58


 


Pulse  98 H  12/12/17 13:58


 


Resp  22   12/12/17 13:58


 


BP  120/80   12/12/17 13:58


 


Pulse Ox  98   12/12/17 13:58














- Medical History


PMH: Anxiety, Arthritis, Asthma, Bronchitis, CAD, CHF, CVA, HTN, 

Hypercholesterolemia, Hyperlipidemia, Hypothyroidism, Osteoporosis


   Denies: COPD, HIV, Chronic Kidney Disease, Rheumatoid Arthritis





- Surgical History


Surgical History: Appendectomy


   Denies: Pacemaker





- Family History


Family History: States: Unknown Family Hx





- Social History


Current smoker - smoking cessation education provided: No


Alcohol: None


Drugs: Denies





- Immunization History


Hx Tetanus Toxoid Vaccination: No


Hx Influenza Vaccination: No


Hx Pneumococcal Vaccination: No





- Home Medications


Home Medications: 


 Ambulatory Orders











 Medication  Instructions  Recorded


 


Carvedilol [Coreg] 3.125 mg PO BID 07/27/17


 


Duloxetine HCl [Duloxetine] 60 mg PO DAILY 07/27/17


 


Furosemide [Lasix] 20 mg PO DAILY 07/27/17


 


Gabapentin [Neurontin] 100 mg PO DAILY 07/27/17


 


Lansoprazole [Prevacid] 30 mg PO DAILY 07/27/17


 


Levothyroxine [Synthroid] 75 mcg PO DAILY 07/27/17


 


Megestrol [Megace] 20 mg PO BID 07/27/17


 


Ranolazine [Ranexa] 500 mg PO BID 07/27/17


 


Simvastatin 20 mg PO DAILY 07/27/17


 


Spironolactone [Aldactone] 25 mg PO BID 07/27/17


 


Valsartan [Diovan] 40 mg PO BID 07/27/17


 


Zolpidem [Ambien] 10 mg PO HS 07/27/17


 


Aspirin [Ecotrin] 81 mg PO DAILY 10/06/17














- Allergies


Allergies/Adverse Reactions: 


 Allergies











Allergy/AdvReac Type Severity Reaction Status Date / Time


 


Penicillins Allergy Severe RASH Verified 07/27/17 10:59














Review of Systems


ROS Statement: Except As Marked, All Systems Reviewed And Found Negative


Cardiovascular: Positive for: Chest Pain


Respiratory: Positive for: Shortness of Breath





Physical Exam





- Reviewed


Nursing Documentation Reviewed: Yes


Vital Signs Reviewed: Yes





- Physical Exam


Appears: Positive for: Non-toxic, No Acute Distress


Head Exam: Positive for: ATRAUMATIC, NORMAL INSPECTION, NORMOCEPHALIC


Skin: Positive for: Normal Color, Warm, DRY


Eye Exam: Positive for: EOMI, Normal appearance, PERRL


ENT: Positive for: Normal ENT Inspection


Neck: Positive for: Normal, Painless ROM


Cardiovascular/Chest: Positive for: Regular Rate, Rhythm.  Negative for: Edema


Respiratory: Positive for: Decreased Breath Sounds


Gastrointestinal/Abdominal: Positive for: Normal Exam, Bowel Sounds, Soft.  

Negative for: Tenderness


Back: Positive for: Normal Inspection.  Negative for: L CVA Tenderness, R CVA 

Tenderness


Extremity: Positive for: Normal ROM.  Negative for: Tenderness, Pedal Edema


Neurologic/Psych: Positive for: Alert, CNs II-XII, Oriented.  Negative for: 

Motor/Sensory Deficits





- Laboratory Results


Result Diagrams: 


 12/12/17 13:35





 12/12/17 13:35


Interpretation Of Abn Labs: 1370 probnp





- ECG


ECG: Positive for: Interpreted By Me, Viewed By Me


ECG Rhythm: Positive for: Sinus Rhythm, Nonspecific Changes


Interpretation Of Abn EKG: similar to old





- Radiology


X-Ray: Read By Radiologist


X-Ray Interpretation: No Acute Disease





- Progress


ED Course And Treament: 





1656:  Stable.  AAOx3.  Pain free.  Spoke with Ramon for Dr. Jensen.  Will admit 

tele.





Disposition





- Clinical Impression


Clinical Impression: 


 CHF exacerbation








- Patient ED Disposition


Is Patient to be Admitted: Yes


Counseled Patient/Family Regarding: Studies Performed, Diagnosis





- Disposition


Disposition Time: 17:15


Condition: FAIR


Forms:  [facility] (English)





- Pt Status Changed To:


Hospital Disposition Of: Observation





- POA


Present On Arrival: None Pt scheduled 8/16 with Dr Hoang Diggs